# Patient Record
Sex: FEMALE | Employment: OTHER | URBAN - METROPOLITAN AREA
[De-identification: names, ages, dates, MRNs, and addresses within clinical notes are randomized per-mention and may not be internally consistent; named-entity substitution may affect disease eponyms.]

---

## 2024-08-15 ENCOUNTER — APPOINTMENT (OUTPATIENT)
Dept: GENERAL RADIOLOGY | Age: 79
End: 2024-08-15
Attending: INTERNAL MEDICINE
Payer: MEDICARE

## 2024-08-15 ENCOUNTER — HOSPITAL ENCOUNTER (INPATIENT)
Age: 79
LOS: 9 days | Discharge: NURSING, REHAB OR LONG-TERM CARE | End: 2024-08-24
Attending: INTERNAL MEDICINE | Admitting: INTERNAL MEDICINE
Payer: MEDICARE

## 2024-08-15 DIAGNOSIS — K92.2 GASTROINTESTINAL HEMORRHAGE, UNSPECIFIED GASTROINTESTINAL HEMORRHAGE TYPE: ICD-10-CM

## 2024-08-15 DIAGNOSIS — K92.2 UPPER GI BLEED: ICD-10-CM

## 2024-08-15 DIAGNOSIS — R06.02 SHORTNESS OF BREATH: Primary | ICD-10-CM

## 2024-08-15 LAB
ANION GAP SERPL CALCULATED.3IONS-SCNC: 11 MMOL/L (ref 9–17)
BUN SERPL-MCNC: 26 MG/DL (ref 8–23)
BUN/CREAT SERPL: 33 (ref 9–20)
CALCIUM SERPL-MCNC: 7.8 MG/DL (ref 8.6–10.4)
CHLORIDE SERPL-SCNC: 107 MMOL/L (ref 98–107)
CO2 SERPL-SCNC: 23 MMOL/L (ref 20–31)
CREAT SERPL-MCNC: 0.8 MG/DL (ref 0.5–0.9)
ERYTHROCYTE [DISTWIDTH] IN BLOOD BY AUTOMATED COUNT: 13.6 % (ref 11.8–14.4)
GFR, ESTIMATED: 75 ML/MIN/1.73M2
GLUCOSE SERPL-MCNC: 118 MG/DL (ref 70–99)
HCT VFR BLD AUTO: 27.1 % (ref 36.3–47.1)
HGB BLD-MCNC: 9.2 G/DL (ref 11.9–15.1)
LACTATE BLDV-SCNC: 1 MMOL/L (ref 0.5–2.2)
MCH RBC QN AUTO: 30.3 PG (ref 25.2–33.5)
MCHC RBC AUTO-ENTMCNC: 33.9 G/DL (ref 28.4–34.8)
MCV RBC AUTO: 89.1 FL (ref 82.6–102.9)
NRBC BLD-RTO: 0.3 PER 100 WBC
PLATELET # BLD AUTO: 225 K/UL (ref 138–453)
PMV BLD AUTO: 9.7 FL (ref 8.1–13.5)
POTASSIUM SERPL-SCNC: 3.6 MMOL/L (ref 3.7–5.3)
RBC # BLD AUTO: 3.04 M/UL (ref 3.95–5.11)
SODIUM SERPL-SCNC: 141 MMOL/L (ref 135–144)
TROPONIN I SERPL HS-MCNC: 105 NG/L (ref 0–14)
WBC OTHER # BLD: 16.8 K/UL (ref 3.5–11.3)

## 2024-08-15 PROCEDURE — 85014 HEMATOCRIT: CPT

## 2024-08-15 PROCEDURE — 85018 HEMOGLOBIN: CPT

## 2024-08-15 PROCEDURE — 86901 BLOOD TYPING SEROLOGIC RH(D): CPT

## 2024-08-15 PROCEDURE — 84484 ASSAY OF TROPONIN QUANT: CPT

## 2024-08-15 PROCEDURE — 86900 BLOOD TYPING SEROLOGIC ABO: CPT

## 2024-08-15 PROCEDURE — 93005 ELECTROCARDIOGRAM TRACING: CPT | Performed by: NURSE PRACTITIONER

## 2024-08-15 PROCEDURE — 85027 COMPLETE CBC AUTOMATED: CPT

## 2024-08-15 PROCEDURE — 6360000002 HC RX W HCPCS: Performed by: INTERNAL MEDICINE

## 2024-08-15 PROCEDURE — 2580000003 HC RX 258: Performed by: NURSE PRACTITIONER

## 2024-08-15 PROCEDURE — 71045 X-RAY EXAM CHEST 1 VIEW: CPT

## 2024-08-15 PROCEDURE — 2580000003 HC RX 258: Performed by: INTERNAL MEDICINE

## 2024-08-15 PROCEDURE — 6360000002 HC RX W HCPCS: Performed by: NURSE PRACTITIONER

## 2024-08-15 PROCEDURE — 2000000000 HC ICU R&B

## 2024-08-15 PROCEDURE — 83874 ASSAY OF MYOGLOBIN: CPT

## 2024-08-15 PROCEDURE — 86920 COMPATIBILITY TEST SPIN: CPT

## 2024-08-15 PROCEDURE — 99222 1ST HOSP IP/OBS MODERATE 55: CPT

## 2024-08-15 PROCEDURE — 83605 ASSAY OF LACTIC ACID: CPT

## 2024-08-15 PROCEDURE — 36415 COLL VENOUS BLD VENIPUNCTURE: CPT

## 2024-08-15 PROCEDURE — 80048 BASIC METABOLIC PNL TOTAL CA: CPT

## 2024-08-15 PROCEDURE — 86850 RBC ANTIBODY SCREEN: CPT

## 2024-08-15 RX ORDER — ACETAMINOPHEN 650 MG/1
650 SUPPOSITORY RECTAL EVERY 6 HOURS PRN
Status: DISCONTINUED | OUTPATIENT
Start: 2024-08-15 | End: 2024-08-16

## 2024-08-15 RX ORDER — SODIUM CHLORIDE 0.9 % (FLUSH) 0.9 %
5-40 SYRINGE (ML) INJECTION EVERY 12 HOURS SCHEDULED
Status: DISCONTINUED | OUTPATIENT
Start: 2024-08-15 | End: 2024-08-24 | Stop reason: HOSPADM

## 2024-08-15 RX ORDER — SODIUM CHLORIDE 0.9 % (FLUSH) 0.9 %
5-40 SYRINGE (ML) INJECTION PRN
Status: DISCONTINUED | OUTPATIENT
Start: 2024-08-15 | End: 2024-08-24 | Stop reason: HOSPADM

## 2024-08-15 RX ORDER — ACETAMINOPHEN 325 MG/1
650 TABLET ORAL EVERY 6 HOURS PRN
Status: DISCONTINUED | OUTPATIENT
Start: 2024-08-15 | End: 2024-08-16

## 2024-08-15 RX ORDER — SODIUM CHLORIDE 9 MG/ML
INJECTION, SOLUTION INTRAVENOUS PRN
Status: DISCONTINUED | OUTPATIENT
Start: 2024-08-15 | End: 2024-08-24 | Stop reason: HOSPADM

## 2024-08-15 RX ORDER — SODIUM CHLORIDE 9 MG/ML
INJECTION, SOLUTION INTRAVENOUS CONTINUOUS
Status: ACTIVE | OUTPATIENT
Start: 2024-08-15 | End: 2024-08-17

## 2024-08-15 RX ORDER — ONDANSETRON 4 MG/1
4 TABLET, ORALLY DISINTEGRATING ORAL EVERY 8 HOURS PRN
Status: DISCONTINUED | OUTPATIENT
Start: 2024-08-15 | End: 2024-08-24 | Stop reason: HOSPADM

## 2024-08-15 RX ORDER — ONDANSETRON 2 MG/ML
4 INJECTION INTRAMUSCULAR; INTRAVENOUS EVERY 6 HOURS PRN
Status: DISCONTINUED | OUTPATIENT
Start: 2024-08-15 | End: 2024-08-24 | Stop reason: HOSPADM

## 2024-08-15 RX ORDER — POTASSIUM CHLORIDE 7.45 MG/ML
10 INJECTION INTRAVENOUS PRN
Status: DISCONTINUED | OUTPATIENT
Start: 2024-08-15 | End: 2024-08-17

## 2024-08-15 RX ORDER — MORPHINE SULFATE 2 MG/ML
2 INJECTION, SOLUTION INTRAMUSCULAR; INTRAVENOUS ONCE
Status: COMPLETED | OUTPATIENT
Start: 2024-08-15 | End: 2024-08-15

## 2024-08-15 RX ORDER — POTASSIUM CHLORIDE 1500 MG/1
40 TABLET, EXTENDED RELEASE ORAL PRN
Status: DISCONTINUED | OUTPATIENT
Start: 2024-08-15 | End: 2024-08-17

## 2024-08-15 RX ADMIN — SODIUM CHLORIDE, PRESERVATIVE FREE 10 ML: 5 INJECTION INTRAVENOUS at 20:21

## 2024-08-15 RX ADMIN — OCTREOTIDE ACETATE 25 MCG/HR: 200 INJECTION, SOLUTION INTRAVENOUS; SUBCUTANEOUS at 20:20

## 2024-08-15 RX ADMIN — MORPHINE SULFATE 2 MG: 2 INJECTION, SOLUTION INTRAMUSCULAR; INTRAVENOUS at 23:20

## 2024-08-15 RX ADMIN — SODIUM CHLORIDE: 9 INJECTION, SOLUTION INTRAVENOUS at 20:19

## 2024-08-15 ASSESSMENT — PAIN SCALES - GENERAL
PAINLEVEL_OUTOF10: 8
PAINLEVEL_OUTOF10: 6
PAINLEVEL_OUTOF10: 8
PAINLEVEL_OUTOF10: 8

## 2024-08-15 ASSESSMENT — PAIN DESCRIPTION - FREQUENCY
FREQUENCY: CONTINUOUS
FREQUENCY: CONTINUOUS

## 2024-08-15 ASSESSMENT — PAIN DESCRIPTION - DESCRIPTORS
DESCRIPTORS: ACHING;SHARP
DESCRIPTORS: ACHING;SHARP

## 2024-08-15 ASSESSMENT — PAIN DESCRIPTION - ORIENTATION
ORIENTATION: RIGHT;LEFT
ORIENTATION: RIGHT;LEFT

## 2024-08-15 ASSESSMENT — PAIN - FUNCTIONAL ASSESSMENT
PAIN_FUNCTIONAL_ASSESSMENT: PREVENTS OR INTERFERES SOME ACTIVE ACTIVITIES AND ADLS
PAIN_FUNCTIONAL_ASSESSMENT: PREVENTS OR INTERFERES SOME ACTIVE ACTIVITIES AND ADLS

## 2024-08-15 ASSESSMENT — PAIN DESCRIPTION - ONSET
ONSET: ON-GOING
ONSET: ON-GOING

## 2024-08-15 ASSESSMENT — PAIN DESCRIPTION - PAIN TYPE
TYPE: ACUTE PAIN
TYPE: ACUTE PAIN

## 2024-08-15 ASSESSMENT — PAIN DESCRIPTION - LOCATION
LOCATION: CHEST
LOCATION: CHEST;RIB CAGE

## 2024-08-15 NOTE — CONSENT
Informed Consent for Blood Component Transfusion Note    I have discussed with the patient the rationale for blood component transfusion; its benefits in treating or preventing fatigue, organ damage, or death; and its risk which includes mild transfusion reactions, rare risk of blood borne infection, or more serious but rare reactions. I have discussed the alternatives to transfusion, including the risk and consequences of not receiving transfusion. The patient had an opportunity to ask questions and had agreed to proceed with transfusion of blood components.    Electronically signed by Shelli Gant MD on 8/15/24 at 7:09 PM EDT

## 2024-08-15 NOTE — PLAN OF CARE
[facility]  Office: [phone]  Randall Lundberg DO, Delon Robbins DO, Donte Joseph DO, Lexa Gamez DO, Shelli Gant MD, Shruti Soliz MD, Brandon Peter MD, Mery Hutchinson MD,  Rene Alas MD, Malick Chris MD, Mahsa Barrett MD,  Salome Rodriguez DO, Felicity Rangel MD, Mynor Mcmillan MD, Rohan Lundberg DO, Nati Arnold MD,  Antwan Rowan DO, Catrina Terry MD, Reny Olmstead MD, Rupali Trent MD, Raissa Cunningham MD,  Odilon Yee MD, Nick Wade MD, Surekha Ibrahim MD, Vickey Quintero MD, Joseph Pugh MD, Randy Regalado MD, Rene Black DO, Ramiro Munson DO, Quan Ravi MD,  Esteban Aranda MD, Shirley Waterhouse, CNP,  Sallie Romero CNP, Tadeo Sherman, CNP,  Polly Ramsey, POLINA, Loli Beltran, CNP, Natividad Gill, CNP, Yessica Juárez CNP, Marguerite Morelos CNP, Jackeline Levin, PA-C, Sheila Neff PA-C, Jazmine Ashford, CNP, Nathalie Meredith, CNP, Norma Gauthier, CNP, Kristin Lawrence CNP, Tala Hoover, CNS, Bhavani Rodriguez, TORITO, Gretta Palacios CNP, Tracy Schwab, CNP         [facility]   IN-PATIENT SERVICE   [facility]          8/15/2024    6:58 PM    Name:   Rosa Maria Joya  MRN:     [id number]     Acct:      [id number]   Room:   1115/1115-01   Day:  0  Admit Date:  8/15/2024  6:31 PM    PCP:   No primary care provider on file.  Code Status:  Full Code      Patient has been transferred from [facility].  As per information given to our NP she has history of GI bleed with hematemesis and occult blood positive stools her hemoglobin was 5.7 and she has been given 2 units of blood at [facility] currently she is on Protonix drip and octreotide drip.  Her CODE STATUS is DNR CCA.    Unfortunately so far registration of the patient has not been completed, I requested that nurse to release admitting orders so that I can review and order the labs stat to decide if more blood is needed to be given today.    Patient states she has been having black stools

## 2024-08-16 ENCOUNTER — APPOINTMENT (OUTPATIENT)
Dept: GENERAL RADIOLOGY | Age: 79
End: 2024-08-16
Attending: INTERNAL MEDICINE
Payer: MEDICARE

## 2024-08-16 ENCOUNTER — APPOINTMENT (OUTPATIENT)
Age: 79
End: 2024-08-16
Attending: INTERNAL MEDICINE
Payer: MEDICARE

## 2024-08-16 PROBLEM — R79.89 TROPONIN LEVEL ELEVATED: Status: ACTIVE | Noted: 2024-08-16

## 2024-08-16 PROBLEM — F17.200 SMOKER: Status: ACTIVE | Noted: 2024-08-16

## 2024-08-16 PROBLEM — F19.90 EXCESSIVE USE OF NONSTEROIDAL ANTI-INFLAMMATORY DRUG (NSAID): Status: ACTIVE | Noted: 2024-08-16

## 2024-08-16 LAB
ALBUMIN SERPL-MCNC: 3 G/DL (ref 3.5–5.2)
ALP SERPL-CCNC: 40 U/L (ref 35–104)
ALT SERPL-CCNC: 39 U/L (ref 5–33)
ANION GAP SERPL CALCULATED.3IONS-SCNC: 10 MMOL/L (ref 9–17)
AST SERPL-CCNC: 53 U/L
BILIRUB SERPL-MCNC: 0.2 MG/DL (ref 0.3–1.2)
BUN SERPL-MCNC: 23 MG/DL (ref 8–23)
BUN/CREAT SERPL: 33 (ref 9–20)
CALCIUM SERPL-MCNC: 7.7 MG/DL (ref 8.6–10.4)
CHLORIDE SERPL-SCNC: 110 MMOL/L (ref 98–107)
CO2 SERPL-SCNC: 24 MMOL/L (ref 20–31)
CREAT SERPL-MCNC: 0.7 MG/DL (ref 0.5–0.9)
ECHO AO ROOT DIAM: 2.5 CM
ECHO AO ROOT INDEX: 1.67 CM/M2
ECHO AV PEAK GRADIENT: 9 MMHG
ECHO AV PEAK VELOCITY: 1.5 M/S
ECHO BSA: 1.51 M2
ECHO EST RA PRESSURE: 3 MMHG
ECHO LA AREA 4C: 20.9 CM2
ECHO LA DIAMETER INDEX: 2.6 CM/M2
ECHO LA DIAMETER: 3.9 CM
ECHO LA MAJOR AXIS: 6 CM
ECHO LA TO AORTIC ROOT RATIO: 1.56
ECHO LA VOL MOD A4C: 58 ML (ref 22–52)
ECHO LA VOLUME INDEX MOD A4C: 39 ML/M2 (ref 16–34)
ECHO LV E' LATERAL VELOCITY: 6 CM/S
ECHO LV E' SEPTAL VELOCITY: 4 CM/S
ECHO LV FRACTIONAL SHORTENING: 42 % (ref 28–44)
ECHO LV INTERNAL DIMENSION DIASTOLE INDEX: 2.87 CM/M2
ECHO LV INTERNAL DIMENSION DIASTOLIC: 4.3 CM (ref 3.9–5.3)
ECHO LV INTERNAL DIMENSION SYSTOLIC INDEX: 1.67 CM/M2
ECHO LV INTERNAL DIMENSION SYSTOLIC: 2.5 CM
ECHO LV IVSD: 0.8 CM (ref 0.6–0.9)
ECHO LV MASS 2D: 114.2 G (ref 67–162)
ECHO LV MASS INDEX 2D: 76.1 G/M2 (ref 43–95)
ECHO LV POSTERIOR WALL DIASTOLIC: 0.9 CM (ref 0.6–0.9)
ECHO LV RELATIVE WALL THICKNESS RATIO: 0.42
ECHO MV A VELOCITY: 0.95 M/S
ECHO MV E DECELERATION TIME (DT): 134 MS
ECHO MV E VELOCITY: 1.04 M/S
ECHO MV E/A RATIO: 1.09
ECHO MV E/E' LATERAL: 17.33
ECHO MV E/E' RATIO (AVERAGED): 21.67
ECHO MV E/E' SEPTAL: 26
ECHO RIGHT VENTRICULAR SYSTOLIC PRESSURE (RVSP): 25 MMHG
ECHO TV REGURGITANT MAX VELOCITY: 2.32 M/S
ECHO TV REGURGITANT PEAK GRADIENT: 22 MMHG
GFR, ESTIMATED: 88 ML/MIN/1.73M2
GLUCOSE BLD-MCNC: 123 MG/DL (ref 65–105)
GLUCOSE SERPL-MCNC: 131 MG/DL (ref 70–99)
HCT VFR BLD AUTO: 26.6 % (ref 36.3–47.1)
HCT VFR BLD AUTO: 27.5 % (ref 36.3–47.1)
HCT VFR BLD AUTO: 27.7 % (ref 36.3–47.1)
HCT VFR BLD AUTO: 27.7 % (ref 36.3–47.1)
HCT VFR BLD AUTO: 28.7 % (ref 36.3–47.1)
HCT VFR BLD AUTO: 30.9 % (ref 36.3–47.1)
HCT VFR BLD AUTO: 31.9 % (ref 36.3–47.1)
HGB BLD-MCNC: 10 G/DL (ref 11.9–15.1)
HGB BLD-MCNC: 8.6 G/DL (ref 11.9–15.1)
HGB BLD-MCNC: 8.9 G/DL (ref 11.9–15.1)
HGB BLD-MCNC: 9 G/DL (ref 11.9–15.1)
HGB BLD-MCNC: 9.1 G/DL (ref 11.9–15.1)
HGB BLD-MCNC: 9.4 G/DL (ref 11.9–15.1)
HGB BLD-MCNC: 9.9 G/DL (ref 11.9–15.1)
INR PPP: 1.2
IRON SATN MFR SERPL: 17 % (ref 20–55)
IRON SERPL-MCNC: 45 UG/DL (ref 37–145)
LACTATE BLDV-SCNC: 0.8 MMOL/L (ref 0.5–2.2)
MYOGLOBIN SERPL-MCNC: 147 NG/ML (ref 25–58)
MYOGLOBIN SERPL-MCNC: 184 NG/ML (ref 25–58)
MYOGLOBIN SERPL-MCNC: 275 NG/ML (ref 25–58)
PARTIAL THROMBOPLASTIN TIME: 28 SEC (ref 23.9–33.8)
PHOSPHATE SERPL-MCNC: 2.7 MG/DL (ref 2.6–4.5)
POTASSIUM SERPL-SCNC: 4 MMOL/L (ref 3.7–5.3)
PROCALCITONIN SERPL-MCNC: 0.09 NG/ML (ref 0–0.09)
PROT SERPL-MCNC: 5.1 G/DL (ref 6.4–8.3)
PROTHROMBIN TIME: 15.4 SEC (ref 11.5–14.2)
SODIUM SERPL-SCNC: 144 MMOL/L (ref 135–144)
TIBC SERPL-MCNC: 271 UG/DL (ref 250–450)
TROPONIN I SERPL HS-MCNC: 102 NG/L (ref 0–14)
TROPONIN I SERPL HS-MCNC: 110 NG/L (ref 0–14)
TROPONIN I SERPL HS-MCNC: 118 NG/L (ref 0–14)
UNSATURATED IRON BINDING CAPACITY: 226 UG/DL (ref 112–347)

## 2024-08-16 PROCEDURE — 3700000001 HC ADD 15 MINUTES (ANESTHESIA): Performed by: INTERNAL MEDICINE

## 2024-08-16 PROCEDURE — 93306 TTE W/DOPPLER COMPLETE: CPT

## 2024-08-16 PROCEDURE — 2500000003 HC RX 250 WO HCPCS: Performed by: INTERNAL MEDICINE

## 2024-08-16 PROCEDURE — 80053 COMPREHEN METABOLIC PANEL: CPT

## 2024-08-16 PROCEDURE — 2580000003 HC RX 258: Performed by: INTERNAL MEDICINE

## 2024-08-16 PROCEDURE — 83874 ASSAY OF MYOGLOBIN: CPT

## 2024-08-16 PROCEDURE — 83540 ASSAY OF IRON: CPT

## 2024-08-16 PROCEDURE — 85018 HEMOGLOBIN: CPT

## 2024-08-16 PROCEDURE — 3609013000 HC EGD TRANSORAL CONTROL BLEEDING ANY METHOD: Performed by: INTERNAL MEDICINE

## 2024-08-16 PROCEDURE — 6360000002 HC RX W HCPCS: Performed by: ANESTHESIOLOGY

## 2024-08-16 PROCEDURE — 99232 SBSQ HOSP IP/OBS MODERATE 35: CPT | Performed by: INTERNAL MEDICINE

## 2024-08-16 PROCEDURE — 83605 ASSAY OF LACTIC ACID: CPT

## 2024-08-16 PROCEDURE — 36415 COLL VENOUS BLD VENIPUNCTURE: CPT

## 2024-08-16 PROCEDURE — 84145 PROCALCITONIN (PCT): CPT

## 2024-08-16 PROCEDURE — 0D968ZX DRAINAGE OF STOMACH, VIA NATURAL OR ARTIFICIAL OPENING ENDOSCOPIC, DIAGNOSTIC: ICD-10-PCS | Performed by: INTERNAL MEDICINE

## 2024-08-16 PROCEDURE — 2720000010 HC SURG SUPPLY STERILE: Performed by: INTERNAL MEDICINE

## 2024-08-16 PROCEDURE — 85730 THROMBOPLASTIN TIME PARTIAL: CPT

## 2024-08-16 PROCEDURE — 2709999900 HC NON-CHARGEABLE SUPPLY: Performed by: INTERNAL MEDICINE

## 2024-08-16 PROCEDURE — 88305 TISSUE EXAM BY PATHOLOGIST: CPT

## 2024-08-16 PROCEDURE — 2000000000 HC ICU R&B

## 2024-08-16 PROCEDURE — 6370000000 HC RX 637 (ALT 250 FOR IP): Performed by: INTERNAL MEDICINE

## 2024-08-16 PROCEDURE — 6360000002 HC RX W HCPCS: Performed by: INTERNAL MEDICINE

## 2024-08-16 PROCEDURE — 0D598ZZ DESTRUCTION OF DUODENUM, VIA NATURAL OR ARTIFICIAL OPENING ENDOSCOPIC: ICD-10-PCS | Performed by: INTERNAL MEDICINE

## 2024-08-16 PROCEDURE — 6360000002 HC RX W HCPCS

## 2024-08-16 PROCEDURE — 74022 RADEX COMPL AQT ABD SERIES: CPT

## 2024-08-16 PROCEDURE — 02HV33Z INSERTION OF INFUSION DEVICE INTO SUPERIOR VENA CAVA, PERCUTANEOUS APPROACH: ICD-10-PCS | Performed by: INTERNAL MEDICINE

## 2024-08-16 PROCEDURE — 3609012400 HC EGD TRANSORAL BIOPSY SINGLE/MULTIPLE: Performed by: INTERNAL MEDICINE

## 2024-08-16 PROCEDURE — 2580000003 HC RX 258: Performed by: NURSE PRACTITIONER

## 2024-08-16 PROCEDURE — 82947 ASSAY GLUCOSE BLOOD QUANT: CPT

## 2024-08-16 PROCEDURE — 93005 ELECTROCARDIOGRAM TRACING: CPT | Performed by: INTERNAL MEDICINE

## 2024-08-16 PROCEDURE — 7100000000 HC PACU RECOVERY - FIRST 15 MIN: Performed by: INTERNAL MEDICINE

## 2024-08-16 PROCEDURE — 83550 IRON BINDING TEST: CPT

## 2024-08-16 PROCEDURE — 85610 PROTHROMBIN TIME: CPT

## 2024-08-16 PROCEDURE — 2580000003 HC RX 258

## 2024-08-16 PROCEDURE — 84484 ASSAY OF TROPONIN QUANT: CPT

## 2024-08-16 PROCEDURE — 3700000000 HC ANESTHESIA ATTENDED CARE: Performed by: INTERNAL MEDICINE

## 2024-08-16 PROCEDURE — 85014 HEMATOCRIT: CPT

## 2024-08-16 PROCEDURE — 7100000001 HC PACU RECOVERY - ADDTL 15 MIN: Performed by: INTERNAL MEDICINE

## 2024-08-16 PROCEDURE — 74018 RADEX ABDOMEN 1 VIEW: CPT

## 2024-08-16 PROCEDURE — 84100 ASSAY OF PHOSPHORUS: CPT

## 2024-08-16 PROCEDURE — 2500000003 HC RX 250 WO HCPCS

## 2024-08-16 RX ORDER — SUCRALFATE 1 G/1
1 TABLET ORAL 4 TIMES DAILY
COMMUNITY

## 2024-08-16 RX ORDER — ALBUTEROL SULFATE 0.83 MG/ML
2.5 SOLUTION RESPIRATORY (INHALATION) ONCE
Status: COMPLETED | OUTPATIENT
Start: 2024-08-16 | End: 2024-08-16

## 2024-08-16 RX ORDER — METOPROLOL SUCCINATE 25 MG/1
25 TABLET, EXTENDED RELEASE ORAL DAILY
Status: ON HOLD | COMMUNITY
End: 2024-08-24 | Stop reason: HOSPADM

## 2024-08-16 RX ORDER — ACETAMINOPHEN 325 MG/1
650 TABLET ORAL EVERY 6 HOURS PRN
Status: DISCONTINUED | OUTPATIENT
Start: 2024-08-16 | End: 2024-08-24 | Stop reason: HOSPADM

## 2024-08-16 RX ORDER — MORPHINE SULFATE 2 MG/ML
2 INJECTION, SOLUTION INTRAMUSCULAR; INTRAVENOUS EVERY 4 HOURS PRN
Status: DISCONTINUED | OUTPATIENT
Start: 2024-08-16 | End: 2024-08-20

## 2024-08-16 RX ORDER — FUROSEMIDE 10 MG/ML
20 INJECTION INTRAMUSCULAR; INTRAVENOUS ONCE
Status: COMPLETED | OUTPATIENT
Start: 2024-08-16 | End: 2024-08-16

## 2024-08-16 RX ORDER — LOSARTAN POTASSIUM 100 MG/1
100 TABLET ORAL DAILY
COMMUNITY

## 2024-08-16 RX ORDER — SERTRALINE HYDROCHLORIDE 200 MG/1
2 CAPSULE ORAL DAILY
Status: ON HOLD | COMMUNITY
End: 2024-08-24

## 2024-08-16 RX ORDER — PIOGLITAZONEHYDROCHLORIDE 30 MG/1
30 TABLET ORAL DAILY
Status: ON HOLD | COMMUNITY
End: 2024-08-24 | Stop reason: HOSPADM

## 2024-08-16 RX ORDER — THIAMINE HYDROCHLORIDE 100 MG/ML
100 INJECTION, SOLUTION INTRAMUSCULAR; INTRAVENOUS DAILY
Status: DISCONTINUED | OUTPATIENT
Start: 2024-08-16 | End: 2024-08-18

## 2024-08-16 RX ORDER — FUROSEMIDE 10 MG/ML
40 INJECTION INTRAMUSCULAR; INTRAVENOUS ONCE
Status: COMPLETED | OUTPATIENT
Start: 2024-08-16 | End: 2024-08-16

## 2024-08-16 RX ORDER — NICOTINE 21 MG/24HR
1 PATCH, TRANSDERMAL 24 HOURS TRANSDERMAL DAILY
Status: DISCONTINUED | OUTPATIENT
Start: 2024-08-16 | End: 2024-08-24 | Stop reason: HOSPADM

## 2024-08-16 RX ORDER — PANTOPRAZOLE SODIUM 40 MG/1
40 TABLET, DELAYED RELEASE ORAL 2 TIMES DAILY
Status: ON HOLD | COMMUNITY
End: 2024-08-24

## 2024-08-16 RX ORDER — EPINEPHRINE 1 MG/ML
INJECTION, SOLUTION INTRAMUSCULAR; SUBCUTANEOUS PRN
Status: DISCONTINUED | OUTPATIENT
Start: 2024-08-16 | End: 2024-08-16 | Stop reason: ALTCHOICE

## 2024-08-16 RX ORDER — ACETAMINOPHEN 650 MG/1
650 SUPPOSITORY RECTAL EVERY 6 HOURS PRN
Status: DISCONTINUED | OUTPATIENT
Start: 2024-08-16 | End: 2024-08-24 | Stop reason: HOSPADM

## 2024-08-16 RX ADMIN — THIAMINE HYDROCHLORIDE: 100 INJECTION, SOLUTION INTRAMUSCULAR; INTRAVENOUS at 08:37

## 2024-08-16 RX ADMIN — OCTREOTIDE ACETATE 25 MCG/HR: 200 INJECTION, SOLUTION INTRAVENOUS; SUBCUTANEOUS at 12:35

## 2024-08-16 RX ADMIN — FUROSEMIDE 40 MG: 10 INJECTION, SOLUTION INTRAMUSCULAR; INTRAVENOUS at 19:35

## 2024-08-16 RX ADMIN — THIAMINE HYDROCHLORIDE 100 MG: 100 INJECTION, SOLUTION INTRAMUSCULAR; INTRAVENOUS at 21:41

## 2024-08-16 RX ADMIN — PANTOPRAZOLE SODIUM 8 MG/HR: 40 INJECTION, POWDER, FOR SOLUTION INTRAVENOUS at 22:27

## 2024-08-16 RX ADMIN — PANTOPRAZOLE SODIUM 8 MG/HR: 40 INJECTION, POWDER, FOR SOLUTION INTRAVENOUS at 02:26

## 2024-08-16 RX ADMIN — SODIUM CHLORIDE: 9 INJECTION, SOLUTION INTRAVENOUS at 22:29

## 2024-08-16 RX ADMIN — FOLIC ACID 1 MG: 5 INJECTION, SOLUTION INTRAMUSCULAR; INTRAVENOUS; SUBCUTANEOUS at 21:48

## 2024-08-16 RX ADMIN — SODIUM CHLORIDE, PRESERVATIVE FREE 10 ML: 5 INJECTION INTRAVENOUS at 22:13

## 2024-08-16 RX ADMIN — ALBUTEROL SULFATE 2.5 MG: 2.5 SOLUTION RESPIRATORY (INHALATION) at 16:46

## 2024-08-16 RX ADMIN — SODIUM CHLORIDE, PRESERVATIVE FREE 80 MG: 5 INJECTION INTRAVENOUS at 02:01

## 2024-08-16 RX ADMIN — PANTOPRAZOLE SODIUM 8 MG/HR: 40 INJECTION, POWDER, FOR SOLUTION INTRAVENOUS at 12:30

## 2024-08-16 RX ADMIN — FUROSEMIDE 20 MG: 10 INJECTION, SOLUTION INTRAMUSCULAR; INTRAVENOUS at 16:47

## 2024-08-16 RX ADMIN — MORPHINE SULFATE 2 MG: 2 INJECTION, SOLUTION INTRAMUSCULAR; INTRAVENOUS at 12:32

## 2024-08-16 RX ADMIN — MORPHINE SULFATE 2 MG: 2 INJECTION, SOLUTION INTRAMUSCULAR; INTRAVENOUS at 19:28

## 2024-08-16 RX ADMIN — SODIUM CHLORIDE, PRESERVATIVE FREE 10 ML: 5 INJECTION INTRAVENOUS at 08:34

## 2024-08-16 RX ADMIN — SODIUM CHLORIDE: 9 INJECTION, SOLUTION INTRAVENOUS at 05:32

## 2024-08-16 RX ADMIN — MORPHINE SULFATE 2 MG: 2 INJECTION, SOLUTION INTRAMUSCULAR; INTRAVENOUS at 06:28

## 2024-08-16 ASSESSMENT — PAIN SCALES - GENERAL
PAINLEVEL_OUTOF10: 7
PAINLEVEL_OUTOF10: 7
PAINLEVEL_OUTOF10: 3
PAINLEVEL_OUTOF10: 3
PAINLEVEL_OUTOF10: 7
PAINLEVEL_OUTOF10: 7
PAINLEVEL_OUTOF10: 6
PAINLEVEL_OUTOF10: 3

## 2024-08-16 ASSESSMENT — PAIN DESCRIPTION - LOCATION
LOCATION: HEAD
LOCATION: CHEST
LOCATION: ABDOMEN
LOCATION: ABDOMEN

## 2024-08-16 ASSESSMENT — PAIN DESCRIPTION - PAIN TYPE
TYPE: ACUTE PAIN
TYPE: ACUTE PAIN

## 2024-08-16 ASSESSMENT — PAIN DESCRIPTION - ONSET
ONSET: ON-GOING
ONSET: ON-GOING

## 2024-08-16 ASSESSMENT — PAIN DESCRIPTION - DESCRIPTORS
DESCRIPTORS: ACHING;SORE;SHARP
DESCRIPTORS: ACHING;SHARP
DESCRIPTORS: ACHING
DESCRIPTORS: SHARP

## 2024-08-16 ASSESSMENT — PAIN SCALES - WONG BAKER: WONGBAKER_NUMERICALRESPONSE: NO HURT

## 2024-08-16 ASSESSMENT — PAIN - FUNCTIONAL ASSESSMENT
PAIN_FUNCTIONAL_ASSESSMENT: PREVENTS OR INTERFERES SOME ACTIVE ACTIVITIES AND ADLS
PAIN_FUNCTIONAL_ASSESSMENT: PREVENTS OR INTERFERES WITH MANY ACTIVE NOT PASSIVE ACTIVITIES
PAIN_FUNCTIONAL_ASSESSMENT: PREVENTS OR INTERFERES SOME ACTIVE ACTIVITIES AND ADLS

## 2024-08-16 ASSESSMENT — PAIN DESCRIPTION - ORIENTATION
ORIENTATION: RIGHT;LEFT
ORIENTATION: LEFT
ORIENTATION: LEFT;RIGHT
ORIENTATION: RIGHT;LEFT

## 2024-08-16 ASSESSMENT — PAIN DESCRIPTION - FREQUENCY
FREQUENCY: CONTINUOUS
FREQUENCY: CONTINUOUS

## 2024-08-16 NOTE — OP NOTE
EGD NOTE      Patient:   Rosa Maria Joya    :    1945    Facility:   [facility]  Referring/PCP: No primary care provider on file.    Procedure:   Esophagogastroduodenoscopy with biopsy, control of bleeding  Date:     2024   Endoscopist:  Van South D.O.  Assistant:                  None    Preoperative Diagnosis:     Hematemesis  2.   Acute blood loss anemia    Postoperative Diagnosis:    Schatzki ring.  Active UGI bleed with visible vessel (duodenum)    Anesthesia:  MAC    Complications: None    Description of Procedure:  Informed consent was obtained after explanation of the procedure including indications, description of the procedure,  benefits and possible risks and complications of the procedure, and alternatives. Questions were answered.  The patient's history was reviewed and a directed physical examination was performed prior to the procedure.    Patient was monitored throughout the procedure with pulse oximetry and periodic assessment of vital signs. Patient was sedated as noted above. With the patient in the left lateral decubitus position, the Olympus videoendoscope was placed in the patient's mouth and under direct visualization passed into the esophagus.  Visualization of the esophagus, stomach, and duodenum was performed during both introduction and withdrawal of the endoscope and retroflexed view of the proximal stomach was obtained. The scope was passed to the 2nd portion of the duodenum.  The patient tolerated the procedure well and was taken to the recovery area in good condition.    EBL: none  Specimen:  Gastric  Implants: None      Findings::   Esophagus: Mild schatzki ring.   Stomach: Large amount of fresh blood with clots in body.  Irrigated/suctioned/cleared.  No lesions seen.  Duodenum: Actively bleeding/spurting prominent vessel at spot just between 1st/2nd part of duodenum.  Suspect Dieulafoy lesion vs small ulcer with exposed vessel.  4 cc epi injection and bipolar

## 2024-08-16 NOTE — PROGRESS NOTES
End Of Shift Note  [facility]  Summary of shift: Patient alert, oriented x4 with periods of disorientation and hallucinations, lethargy improving after EGD. Patient to EGD, see op note. Post op complications with dyspnea and abdominal distention, see notes. NG placed and to LIWS, no output. Abdomen soft, distended, tenderness. Xray concerning for CHF, received lasix, see mar. Unmeasured large amount of urine output, having dark red bowel movements after EGD. Cardiology consulted. Continues on Protonix drip. Plan for picc line placement by access. H&H q4h, NPO.     Vitals:    Vitals:    08/16/24 1800 08/16/24 1842 08/16/24 1844 08/16/24 1928   BP: (!) 145/58      Pulse: (!) 111      Resp: 19   15   Temp: 97.3 °F (36.3 °C)      TempSrc:       SpO2: 98% 94% 90%    Weight:       Height:            I&O:   Intake/Output Summary (Last 24 hours) at 8/16/2024 1934  Last data filed at 8/16/2024 1932  Gross per 24 hour   Intake 2488.83 ml   Output 550 ml   Net 1938.83 ml       Resp Status: nasal canula, 4L, rhonchi/diminished    Ventilator Settings:     / / /     Critical Care IV infusions:   sodium chloride 100 mL/hr at 08/16/24 1932    sodium chloride      pantoprazole 8 mg/hr (08/16/24 1932)        LDA:   Peripheral IV 08/16/24 Left (Active)   Number of days: 0       NG/OG/NJ/NE Tube Nasogastric 14 fr Left nostril (Active)   Number of days: 0       External Urinary Catheter (Active)   Number of days: 0

## 2024-08-16 NOTE — H&P
[facility]  Office: [phone]  aRndall Lundberg DO, Delon Robbins DO, Donte Joseph DO, Lexa Gamez DO, Shelli Gant MD, Shruti Soliz MD, Brandon Peter MD, Mery Hutchinson MD,  Rene Alas MD, Malick Chris MD, Mahsa Barrett MD,  Salome Rodriguez DO, Felicity Rangel MD, Mynor Mcmillan MD, Rohan Lundberg DO, Nati Arnold MD,  Antwan Rowan DO, Catrina Terry MD, Reny Olmstead MD, Rupali Trent MD, Raissa Cunningham MD,  Odilon Yee MD, Nick Wade MD, Surekha Ibrahim MD, Vickey Quintero MD, Joseph Pugh MD, Randy Regalado MD, Rene Black DO, Ramiro Munson DO, Quan Ravi MD,  Esteban Aranda MD, Shirley Waterhouse, CNP,  Sallie Romero, CNP, Tadeo Sherman, CNP,  Polly Ramsey, DNP, Loli Beltran, CNP, Natividad Gill, CNP, Kristin Lawrence CNP, Yessica Juárez, CNP, Marguerite Morelos, CNP, Jackeline Levin, PA-C, Sheila Neff, PA-C, Jazmine Ashford, CNP, Ashley Howard, CNP, Norma Gauthier, CNP, Tala Hoover, CNS, Bhavani Rodriguez, CNP, Gretta Palacios, CNP, Tracy Schwab, CNP         [facility]   IN-PATIENT SERVICE   [facility]    HISTORY AND PHYSICAL EXAMINATION            Date:   8/15/2024  Patient name:  Rosa Maria Joya  Date of admission:  8/15/2024  6:31 PM  MRN:   [id number]  Account:  [id number]  YOB: 1945  PCP:    No primary care provider on file.  Room:   [address] [place] [address]  Code Status:    DNR-CCA    Chief Complaint:     No chief complaint on file.      History Obtained From:     patient, paper medical record    History of Present Illness:     Rosa Maria Joya is a 78 y.o. Unavailable / unknown female who presents with No chief complaint on file.   and is admitted to the hospital for the management of Upper GI bleed.    Patient presents as a transfer from [facility] where she initially presented with hematemesis. She was recently discharged home with coming hospital on 8/12/2024 status post mechanical fall with broken ribs.  She  does have a past medical history significant for peptic ulcer disease with previous endoscopies.  Upon arrival to outside facility patient's hemoglobin returned at 5.7, WBC 15.2, INR 1.4, creatinine 0.8, sodium 134, lactic acid 5.1 with a negative ethanol level. An EKG revealed normal sinus rhythm with diffuse ST changes without acute injury. GI services were consulted at our facility and the patient was transferred here for higher level of care.  We were asked to admit the patient to medicine services.    During my initial assessment, it is noted that the patient is alert and oriented x 2 and a poor historian. It was difficult to obtain any past medical history additionally current medications.    Past Medical History:     No past medical history on file.     Past Surgical History:     No past surgical history on file.     Medications Prior to Admission:     Prior to Admission medications    Not on File        Allergies:     Patient has no allergy information on record.    Social History:     Tobacco:    has no history on file for tobacco use.  Alcohol:      has no history on file for alcohol use.  Drug Use:  has no history on file for drug use.    Family History:     No family history on file.    Review of Systems:     Positive and Negative as described in HPI.    Review of Systems   Constitutional:  Negative for appetite change, chills, fatigue and fever.   Eyes:  Negative for visual disturbance.   Respiratory:  Negative for shortness of breath and wheezing.    Cardiovascular:  Negative for chest pain and leg swelling.   Gastrointestinal:  Positive for abdominal pain. Negative for constipation, nausea and vomiting.   Endocrine: Negative for polydipsia, polyphagia and polyuria.   Genitourinary:  Negative for decreased urine volume, difficulty urinating and flank pain.   Musculoskeletal:  Negative for arthralgias.   Skin:  Negative for rash.   Neurological:  Negative for tremors and weakness.

## 2024-08-16 NOTE — PROGRESS NOTES
Update received from Dr. South. Family updated and questions answered. Cardiology updated. Awaiting report from RN

## 2024-08-16 NOTE — PROGRESS NOTES
[facility]  Office: [phone]  Randall Lundberg DO, Delon Robbins DO, Donte Joseph DO, Lexa Gamez DO, Shelli Gant MD, Shruti Soliz MD, Brandon Peter MD, Mery Hutchinson MD,  Rene Alas MD, Malick Chris MD, Mahsa Barrett MD,  Salome Rodriguez DO, Felicity Rangel MD, Mynor Mcmillan MD, Rohan Lundberg DO, Nati Arnold MD,  Antwan Rowan DO, Catrina Terry MD, Reny Olmstead MD, Rupali Trent MD, Raissa Cunningham MD,  Odilon Yee MD, Nick Wade MD, Surekha Ibrahim MD, Vickey Quintero MD, Joseph Pugh MD, Randy Regalado MD, Rene Black DO, Ramiro Munson DO, Quan Ravi MD,  Esteban Aranda MD, Shirley Waterhouse, CNP,  Sallie Romero CNP, Tadeo Sherman, CNP,  Polly Ramsey, POLINA, Loli Beltran, CNP, Natividad Gill, CNP, Yessica Juárez CNP, Marguerite Morelos CNP, Jackeline Levin, PA-C, Sheila Neff PA-C, Jazmine Ashford, CNP, Nathalie Meredith, CNP, Norma Gauthier, CNP, Kristin Lawrence CNP, Tala Hoover, CNS, Bhavani Rodriguez, TORITO, Gretta Palacios CNP, Tracy Schwab, CNP         [facility]   IN-PATIENT SERVICE   [facility]    Progress Note    8/16/2024    1:55 PM    Name:   Rosa Maria Joya  MRN:     [id number]     Acct:      [id number]   Room:   [facility] Day:  1  Admit Date:  8/15/2024  6:31 PM    PCP:   No primary care provider on file.  Code Status:  DNR-CCA    Subjective:     C/C: hemetemesis and melena  Interval History Status: .   Patient states she had 2 bloody emesis at night but her daughter at bedside and nurse reported no vomiting overnight.  She was given 2 units of PRBC before transferring to this hospital, her hemoglobin since yesterday has been relatively stable 9.2-9 0.4-8.9.  Troponins have been slowly trending up 105-102-118  EKG shows normal sinus  Echo is done but not resulted yet  Cardiology consult was placed  She is likely to get EGD today  Currently she is on Protonix and octreotide drip  Per daughter-in-law at bedside she     sodium chloride      pantoprazole 8 mg/hr (24 1230)    Octreotide Acetate 500 mcg in sodium chloride 0.9 % 100 mL infusion 25 mcg/hr (24 1235)     PRN Meds: morphine, sodium chloride flush, sodium chloride, ondansetron **OR** ondansetron, acetaminophen **OR** acetaminophen, potassium chloride **OR** potassium alternative oral replacement **OR** potassium chloride    Data:     Past Medical History:   has no past medical history on file.    Social History:   reports that she has been smoking cigarettes. She has never used smokeless tobacco. She reports current alcohol use. She reports that she does not use drugs.     Family History: No family history on file.    Vitals:  BP (!) 140/54   Pulse (!) 104   Temp 97.8 °F (36.6 °C) (Oral)   Resp 19   Ht 1.524 m (5')   Wt 54.2 kg (119 lb 7.8 oz)   SpO2 (!) 88%   BMI 23.34 kg/m²   Temp (24hrs), Av.8 °F (36.6 °C), Min:97.5 °F (36.4 °C), Max:97.9 °F (36.6 °C)    No results for input(s): \"POCGLU\" in the last 72 hours.    I/O (24Hr):    Intake/Output Summary (Last 24 hours) at 2024 1355  Last data filed at 2024 0934  Gross per 24 hour   Intake 1387.35 ml   Output 100 ml   Net 1287.35 ml       Labs:  Hematology:  Recent Labs     08/15/24  2144 08/15/24  2346 24  0716 24  1116 24  1256   WBC 16.8*  --   --   --   --    RBC 3.04*  --   --   --   --    HGB 9.2*   < > 9.1* 9.0* 8.9*   HCT 27.1*   < > 27.7* 27.5* 27.7*   MCV 89.1  --   --   --   --    MCH 30.3  --   --   --   --    MCHC 33.9  --   --   --   --    RDW 13.6  --   --   --   --      --   --   --   --    MPV 9.7  --   --   --   --    INR  --   --  1.2  --   --     < > = values in this interval not displayed.     Chemistry:  Recent Labs     08/15/24  2144 08/15/24  2346 24  0716 24  1256     --  144  --    K 3.6*  --  4.0  --      --  110*  --    CO2 23  --  24  --    GLUCOSE 118*  --  131*  --    BUN 26*  --  23  --    CREATININE 0.8  --

## 2024-08-16 NOTE — CONSULTS
Reason for Consult: Elevated troponin  Requesting Physician: Shelli Gant MD    CHIEF COMPLAINT:  \" I was coughing up blood\"    History Obtained From:  patient    HISTORY OF PRESENT ILLNESS:      The patient is a 78 y.o. female with significant past medical history of hypertension, no prior cardiac history.  She was admitted to [facility] with hematemesis.  Transferred to [facility] for more urgent care.  Underwent urgent EGD today with epi injection and cauterization of active duodenal bleeding source    Received 2 units packed red blood cells yesterday    Given 20 IV of Lasix due to concern for congestion on chest x-ray    Patient without prior cardiac history.  She is complaining of some chest pressure since admission.  She denies prior edema, orthopnea, PND    Telemetry since return from the OR shows sinus tachycardia in the 110s beats per minute    Past Medical History:    No past medical history on file.  Past Surgical History:    No past surgical history on file.  Home Medications:  Prior to Admission medications    Medication Sig Start Date End Date Taking? Authorizing Provider   Sertraline HCl 200 MG CAPS Take 2 tablets by mouth daily   Yes Benji Cisneros MD   sucralfate (CARAFATE) 1 GM tablet Take 1 tablet by mouth 4 times daily   Yes Benji Cisneros MD   pantoprazole (PROTONIX) 40 MG tablet Take 1 tablet by mouth 2 times daily   Yes Benji Cisneros MD   pioglitazone (ACTOS) 30 MG tablet Take 1 tablet by mouth daily   Yes Benji Cisneros MD   metoprolol succinate (TOPROL XL) 25 MG extended release tablet Take 1 tablet by mouth daily   Yes Benji Cisneros MD   losartan (COZAAR) 100 MG tablet Take 1 tablet by mouth daily   Yes Benji Cisneros MD     Current Medications:    Current Facility-Administered Medications   Medication Dose Route Frequency Provider Last Rate Last Admin    morphine (PF) injection 2 mg  2 mg IntraVENous Q4H PRN Van South     RDW 13.6 08/15/2024 09:44 PM     08/15/2024 09:44 PM    MPV 9.7 08/15/2024 09:44 PM     CMP:  Lab Results   Component Value Date/Time     08/16/2024 07:16 AM    K 4.0 08/16/2024 07:16 AM     08/16/2024 07:16 AM    CO2 24 08/16/2024 07:16 AM    BUN 23 08/16/2024 07:16 AM    CREATININE 0.7 08/16/2024 07:16 AM    LABGLOM 88 08/16/2024 07:16 AM    GLUCOSE 131 08/16/2024 07:16 AM    CALCIUM 7.7 08/16/2024 07:16 AM     Magnesium:  No results found for: \"MG\"  PTT:    Lab Results   Component Value Date/Time    APTT 28.0 08/16/2024 07:16 AM     TSH:  No results found for: \"TSH\"  BNP: No results for input(s): \"BNP\", \"PROBNP\" in the last 72 hours.  BMP:  Lab Results   Component Value Date/Time     08/16/2024 07:16 AM    K 4.0 08/16/2024 07:16 AM     08/16/2024 07:16 AM    CO2 24 08/16/2024 07:16 AM    BUN 23 08/16/2024 07:16 AM    CREATININE 0.7 08/16/2024 07:16 AM    CALCIUM 7.7 08/16/2024 07:16 AM    LABGLOM 88 08/16/2024 07:16 AM    GLUCOSE 131 08/16/2024 07:16 AM     LIVER PROFILE:  Recent Labs     08/16/24  0716   AST 53*   ALT 39*   ALKPHOS 40   BILITOT 0.2*   ALBUMIN 3.0*     FLP:  No results found for: \"CHOL\", \"TRIG\", \"HDL\"      EKG (8/16/2024): Normal sinus rhythm 93 bpm, poor R wave progression, nonspecific T wave abnormalities    Echo (8/16/2024): EF 50 to 55%, apical hypokinesis, mild left atrial enlargement, no significant valve abnormalities    ASSESSMENT:     1.  GI bleed/hemorrhage.  Presenting hemoglobin 5.7  2.  Lactic acidosis, improving.  Presenting lactate 5.1  3.  Mildly elevated troponin, likely demand ischemia in setting of above problems  4.  Essential hypertension        PLAN:     Patient without prior documented cardiac history.  She has mildly elevated troponin and wall motion abnormalities in setting of symptomatic hemorrhage and symptomatic anemia.  She also has received 2 units packed red blood cells    Agree with diuresis, agree with additional Lasix 40 mg IV

## 2024-08-16 NOTE — PROGRESS NOTES
Notified by surgery that they plan to take patient for EGD at 1400. Patient Strict NPO. Patient and family member notified and questions answered

## 2024-08-16 NOTE — PROGRESS NOTES
Transitions of Care Pharmacy Service   Medication Review    The patient's list of current home medications has been reviewed.     Source(s) of information: Patient's daughter; home med bottles; Surescripts    Based on information provided by the above source(s), I have updated the patient's home med list as described below.     Please review the ACTION REQUESTED section of this note below for any discrepancies on current hospital orders.      I changed or updated the following medications on the patient's home medication list:  Removed None     Added Sertraline  Sucralfate  Pantoprazole  Pioglitazone  Metoprolol  Losartan       Adjusted   None   Other Notes Daughter states patient is only on above 6 medications.         PROVIDER ACTION REQUESTED  Medications that need to be addressed by a physician/nurse practitioner:    Medication Action Requested        Home med list has been reviewed and updated for provider reconciliation.         Please feel free to call me with any questions about this encounter. Thank you.    Luis Magdaleno RPH   Transitions of Care Pharmacy Service  Phone:  [phone]  Fax: [phone]      Electronically signed by Luis Magdaleno RPH on 8/16/2024 at 5:36 PM         Medications Prior to Admission:   Sertraline HCl 200 MG CAPS, Take 2 tablets by mouth daily  sucralfate (CARAFATE) 1 GM tablet, Take 1 tablet by mouth 4 times daily  pantoprazole (PROTONIX) 40 MG tablet, Take 1 tablet by mouth 2 times daily  pioglitazone (ACTOS) 30 MG tablet, Take 1 tablet by mouth daily  metoprolol succinate (TOPROL XL) 25 MG extended release tablet, Take 1 tablet by mouth daily  losartan (COZAAR) 100 MG tablet, Take 1 tablet by mouth daily

## 2024-08-16 NOTE — CONSULTS
GASTROENTEROLOGY CONSULT       REASON FOR CONSULT:  Upper GI bleed    REQUESTING PHYSICIAN:  Shelli Gant MD    HISTORY OF PRESENT ILLNESS:    The patient is a 78 y.o. female who presents with hematemesis.  She is a poor historian, but has family at bedside who is able to assist with historical information.  Was transferred from [facility], where she presented with hematemesis.  Found to have a hemoglobin of 5.7 upon admission.  Has been having black-colored stools, then began with nausea and vomiting, vomiting Tuesday after dinner.  Blood was noted in emesis.  Continued with vomiting yesterday morning, more blood noted.  Has a stated history of PUD, though no known EGD.  Family does not believe that she has ever had a colonoscopy.  Patient complains of diffuse abdominal pain.          MEDICAL HISTORY:   No past medical history on file.    SURGICAL HISTORY:  No past surgical history on file.    MEDS:  Prior to Admission medications    Not on File     Scheduled Meds:   folic acid 1 mg, thiamine (B-1) 100 mg in sodium chloride 0.9 % 50 mL IVPB   IntraVENous Daily    nicotine  1 patch TransDERmal Daily    sodium chloride flush  5-40 mL IntraVENous 2 times per day     Continuous Infusions:   sodium chloride 100 mL/hr at 08/16/24 0934    sodium chloride      pantoprazole 8 mg/hr (08/16/24 0934)    Octreotide Acetate 500 mcg in sodium chloride 0.9 % 100 mL infusion 25 mcg/hr (08/16/24 0934)     PRN Meds:morphine, sodium chloride flush, sodium chloride, ondansetron **OR** ondansetron, acetaminophen **OR** acetaminophen, potassium chloride **OR** potassium alternative oral replacement **OR** potassium chloride    ALLERGIES:  Not on File    SOCIAL HISTORY:        FAMILY HISTORY:   No family history on file.    REVIEW OF SYSTEMS:  Difficult to obtain d/t mental status.      PHYSICAL EXAM:    BP (!) 145/57   Pulse 96   Temp 97.5 °F (36.4 °C) (Oral)   Resp 11   Ht 1.524 m (5')   Wt 54.2 kg (119 lb 7.8

## 2024-08-16 NOTE — PLAN OF CARE
Problem: Discharge Planning  Goal: Discharge to home or other facility with appropriate resources  Outcome: Progressing  Flowsheets (Taken 8/15/2024 2000)  Discharge to home or other facility with appropriate resources:   Identify barriers to discharge with patient and caregiver   Arrange for needed discharge resources and transportation as appropriate   Identify discharge learning needs (meds, wound care, etc)     Problem: Skin/Tissue Integrity  Goal: Absence of new skin breakdown  Description: 1.  Monitor for areas of redness and/or skin breakdown  2.  Assess vascular access sites hourly  3.  Every 4-6 hours minimum:  Change oxygen saturation probe site  4.  Every 4-6 hours:  If on nasal continuous positive airway pressure, respiratory therapy assess nares and determine need for appliance change or resting period.  Outcome: Progressing     Problem: Safety - Adult  Goal: Free from fall injury  Outcome: Progressing     Problem: Pain  Goal: Verbalizes/displays adequate comfort level or baseline comfort level  Outcome: Progressing

## 2024-08-16 NOTE — PROGRESS NOTES
Spiritual Health Assessment/Progress Note  [facility]    (P) Loneliness/Social Isolation,  ,  ,      Name: Rosa Maria Joya MRN: [id number]    Age: 78 y.o.     Sex: female   Language: English   Mormonism: No Denominational on file   Upper GI bleed     Date: 8/16/2024            Total Time Calculated: (P) 8 min              Spiritual Assessment began in [facility] ICU        Referral/Consult From: (P) Multi-disciplinary team   Encounter Overview/Reason: (P) Loneliness/Social Isolation  Service Provided For: (P) Patient and family together  Patient values prayer.  Mitra, Belief, Meaning:   Patient identifies as spiritual and has beliefs or practices that help with coping during difficult times  Family/Friends have beliefs or practices that help with coping during difficult times      Importance and Influence:  Patient has spiritual/personal beliefs that influence decisions regarding their health  Family/Friends have spiritual/personal beliefs that influence decisions regarding the patient's health    Community:  Patient feels well-supported. Support system includes: Children  Family/Friends feel well-supported. Support system includes: Spouse/Partner    Assessment and Plan of Care:     Patient Interventions include: Facilitated expression of thoughts and feelings, Affirmed coping skills/support systems, and Engaged in life review and/or legacy  Family/Friends Interventions include: Affirmed coping skills/support systems and Engaged in life review and/or legacy    Patient Plan of Care: Spiritual Care available upon further referral  Family/Friends Plan of Care: Spiritual Care available upon further referral    Electronically signed by Chaplain Alberto on 8/16/2024 at 12:55 PM

## 2024-08-16 NOTE — PROGRESS NOTES
Pt oriented to room and call light system. Admission questions done with daughter at bedside. Pt is DNRCCA and was initially ordered as full code. Code status was confirmed with pt that she wanted to be DNRCCA but then when writer checked orders it had already been fixed to DNRCCA. Pt has one working IV. Multiple attempts were made to get other IV's inserted but unsuccessful. Waiting for Ultra sound guided IV to be inserted. For now current drips will have to be held while blood is being administered.

## 2024-08-16 NOTE — PROGRESS NOTES
Patient seen in recovery post EGD.  It was noted that patient had abdominal distention with discomfort after the procedure.  Hemostasis was achieved regarding actively bleeding visible vessel in the duodenum.  Acute abdominal series was then obtained showing no evidence for perforation.  Possible ileus with a large amount of air seen.  NG tube was placed by myself in the PACU at bedside.  Low intermittent suction was carried out and the abdomen did gradually deflate.  Imaging also suggested worsening heart failure.  She received 20 mg of Lasix.  I spent a good amount of time speaking with the patient as well as the nurse in PACU.  I also updated the ICU nurse who will be accepting the patient back.  I informed her of the specific issues as transcribed above.  Nurse also updated family.  Nurse will contact cardiology so that they are aware of the heart failure as well as dyspnea.    Van South D.O.

## 2024-08-16 NOTE — PLAN OF CARE
Problem: Skin/Tissue Integrity  Goal: Absence of new skin breakdown  Description: 1.  Monitor for areas of redness and/or skin breakdown  2.  Assess vascular access sites hourly  3.  Every 4-6 hours minimum:  Change oxygen saturation probe site  4.  Every 4-6 hours:  If on nasal continuous positive airway pressure, respiratory therapy assess nares and determine need for appliance change or resting period.  8/16/2024 1413 by Mitzy Bright RN  Outcome: Progressing     Problem: Safety - Adult  Goal: Free from fall injury  8/16/2024 1413 by Mitzy Bright RN  Outcome: Progressing     Problem: Pain  Goal: Verbalizes/displays adequate comfort level or baseline comfort level  8/16/2024 1413 by Mitzy Bright RN  Outcome: Progressing  Flowsheets  Taken 8/16/2024 1200  Verbalizes/displays adequate comfort level or baseline comfort level:   Encourage patient to monitor pain and request assistance   Assess pain using appropriate pain scale  Taken 8/16/2024 0800  Verbalizes/displays adequate comfort level or baseline comfort level:   Encourage patient to monitor pain and request assistance   Assess pain using appropriate pain scale     Problem: Cardiovascular - Adult  Goal: Maintains optimal cardiac output and hemodynamic stability  Outcome: Progressing  Flowsheets (Taken 8/16/2024 0800)  Maintains optimal cardiac output and hemodynamic stability: Monitor blood pressure and heart rate     Problem: Skin/Tissue Integrity - Adult  Goal: Skin integrity remains intact  Outcome: Progressing  Flowsheets (Taken 8/16/2024 0800)  Skin Integrity Remains Intact: Monitor for areas of redness and/or skin breakdown     Problem: Musculoskeletal - Adult  Goal: Return mobility to safest level of function  Outcome: Progressing  Flowsheets (Taken 8/16/2024 0800)  Return Mobility to Safest Level of Function:   Assess patient stability and activity tolerance for standing, transferring and ambulating with or without assistive devices

## 2024-08-16 NOTE — PROGRESS NOTES
End Of Shift Note  [place] ICU  Summary of shift: Pt oriented/disoriented at times. No blood was given. Protonix, octreotide and normal saline infusing. Pt had no BM, nausea or vomiting. Last Hgb 9.4. No active bleeding. Pt soaked 3 briefs. Morphine ordered to help with pain from rib fractures. Pt very restless throughout night and was taking equipment and oxygen off multiple times.     Vitals:    Vitals:    08/16/24 0200 08/16/24 0300 08/16/24 0400 08/16/24 0524   BP: (!) 132/52 106/85 (!) 136/105 (!) 147/67   Pulse: 92 96 99 (!) 108   Resp: 16 18 17 14   Temp:   97.8 °F (36.6 °C)    TempSrc:   Temporal    SpO2: 97% 90% 90%    Weight:       Height:            I&O:   Intake/Output Summary (Last 24 hours) at 8/16/2024 0731  Last data filed at 8/16/2024 0411  Gross per 24 hour   Intake --   Output 100 ml   Net -100 ml       Resp Status: 4L NC    Ventilator Settings:     / / /     Critical Care IV infusions:   sodium chloride 100 mL/hr at 08/16/24 0532    sodium chloride      pantoprazole 8 mg/hr (08/16/24 0226)    Octreotide Acetate 500 mcg in sodium chloride 0.9 % 100 mL infusion 25 mcg/hr (08/15/24 2020)        LDA:   Peripheral IV 08/15/24 Right Forearm (Active)   Number of days: 0       Peripheral IV 08/16/24 Left (Active)   Number of days: 0       External Urinary Catheter (Active)   Number of days: 0

## 2024-08-16 NOTE — PROGRESS NOTES
Report received from pacu RN. Patient returned to room 1115. Alert, oriented, sinus tach , /63. NG connected to LIWS. Patient educated on NPO diet, NG and purwick. Orders received from attending for PICC line placement. House supervisor notified, access RN being called. Family at bedside, updated and questions answered.

## 2024-08-16 NOTE — PROGRESS NOTES
Patient to surgery for EGD. Report given to RN, patient and family members questions answered. Family member with patient to surgery.

## 2024-08-16 NOTE — PROGRESS NOTES
Called ER again to ask if someone could help get another IV in and they are still busy. Pt hgb 9.2 with no active bleeding. NP asked if they still want the ordered 2u PRBC to be given. Response \"Hold please.\"

## 2024-08-16 NOTE — PROGRESS NOTES
Med rec requested for patient's home medications. Family brought in home medications. Home medication list pending

## 2024-08-17 ENCOUNTER — APPOINTMENT (OUTPATIENT)
Dept: GENERAL RADIOLOGY | Age: 79
End: 2024-08-17
Attending: INTERNAL MEDICINE
Payer: MEDICARE

## 2024-08-17 LAB
ALBUMIN SERPL-MCNC: 3 G/DL (ref 3.5–5.2)
ALP SERPL-CCNC: 40 U/L (ref 35–104)
ALT SERPL-CCNC: 32 U/L (ref 5–33)
AMMONIA PLAS-SCNC: 29 UMOL/L (ref 11–51)
ANION GAP SERPL CALCULATED.3IONS-SCNC: 12 MMOL/L (ref 9–17)
ANION GAP SERPL CALCULATED.3IONS-SCNC: 13 MMOL/L (ref 9–17)
AST SERPL-CCNC: 35 U/L
BILIRUB SERPL-MCNC: 0.2 MG/DL (ref 0.3–1.2)
BUN SERPL-MCNC: 18 MG/DL (ref 8–23)
BUN SERPL-MCNC: 21 MG/DL (ref 8–23)
BUN/CREAT SERPL: 30 (ref 9–20)
BUN/CREAT SERPL: 35 (ref 9–20)
CA-I BLD-SCNC: 1.06 MMOL/L (ref 1.15–1.33)
CALCIUM SERPL-MCNC: 7.4 MG/DL (ref 8.6–10.4)
CALCIUM SERPL-MCNC: 8.3 MG/DL (ref 8.6–10.4)
CHLORIDE SERPL-SCNC: 109 MMOL/L (ref 98–107)
CHLORIDE SERPL-SCNC: 109 MMOL/L (ref 98–107)
CO2 SERPL-SCNC: 26 MMOL/L (ref 20–31)
CO2 SERPL-SCNC: 27 MMOL/L (ref 20–31)
CREAT SERPL-MCNC: 0.6 MG/DL (ref 0.5–0.9)
CREAT SERPL-MCNC: 0.6 MG/DL (ref 0.5–0.9)
EKG ATRIAL RATE: 90 BPM
EKG ATRIAL RATE: 93 BPM
EKG P AXIS: 58 DEGREES
EKG P AXIS: 68 DEGREES
EKG P-R INTERVAL: 154 MS
EKG P-R INTERVAL: 156 MS
EKG Q-T INTERVAL: 372 MS
EKG Q-T INTERVAL: 386 MS
EKG QRS DURATION: 90 MS
EKG QRS DURATION: 90 MS
EKG QTC CALCULATION (BAZETT): 462 MS
EKG QTC CALCULATION (BAZETT): 472 MS
EKG R AXIS: 85 DEGREES
EKG R AXIS: 88 DEGREES
EKG T AXIS: 41 DEGREES
EKG T AXIS: 60 DEGREES
EKG VENTRICULAR RATE: 90 BPM
EKG VENTRICULAR RATE: 93 BPM
GFR, ESTIMATED: >90 ML/MIN/1.73M2
GFR, ESTIMATED: >90 ML/MIN/1.73M2
GLUCOSE SERPL-MCNC: 133 MG/DL (ref 70–99)
GLUCOSE SERPL-MCNC: 136 MG/DL (ref 70–99)
HCT VFR BLD AUTO: 24.5 % (ref 36.3–47.1)
HCT VFR BLD AUTO: 31 % (ref 36.3–47.1)
HCT VFR BLD AUTO: 32.1 % (ref 36.3–47.1)
HCT VFR BLD AUTO: 32.4 % (ref 36.3–47.1)
HCT VFR BLD AUTO: 32.9 % (ref 36.3–47.1)
HGB BLD-MCNC: 10.3 G/DL (ref 11.9–15.1)
HGB BLD-MCNC: 10.4 G/DL (ref 11.9–15.1)
HGB BLD-MCNC: 10.6 G/DL (ref 11.9–15.1)
HGB BLD-MCNC: 7.7 G/DL (ref 11.9–15.1)
HGB BLD-MCNC: 9.9 G/DL (ref 11.9–15.1)
LACTATE BLDV-SCNC: 0.7 MMOL/L (ref 0.5–2.2)
MAGNESIUM SERPL-MCNC: 1.5 MG/DL (ref 1.6–2.6)
MAGNESIUM SERPL-MCNC: 2.3 MG/DL (ref 1.6–2.6)
PHOSPHATE SERPL-MCNC: 1.9 MG/DL (ref 2.6–4.5)
PHOSPHATE SERPL-MCNC: 2 MG/DL (ref 2.6–4.5)
POTASSIUM SERPL-SCNC: 3.3 MMOL/L (ref 3.7–5.3)
POTASSIUM SERPL-SCNC: 3.7 MMOL/L (ref 3.7–5.3)
PROT SERPL-MCNC: 4.8 G/DL (ref 6.4–8.3)
SODIUM SERPL-SCNC: 148 MMOL/L (ref 135–144)
SODIUM SERPL-SCNC: 148 MMOL/L (ref 135–144)

## 2024-08-17 PROCEDURE — 82330 ASSAY OF CALCIUM: CPT

## 2024-08-17 PROCEDURE — 80053 COMPREHEN METABOLIC PANEL: CPT

## 2024-08-17 PROCEDURE — 6360000002 HC RX W HCPCS: Performed by: INTERNAL MEDICINE

## 2024-08-17 PROCEDURE — 2580000003 HC RX 258: Performed by: INTERNAL MEDICINE

## 2024-08-17 PROCEDURE — 99232 SBSQ HOSP IP/OBS MODERATE 35: CPT | Performed by: INTERNAL MEDICINE

## 2024-08-17 PROCEDURE — 83735 ASSAY OF MAGNESIUM: CPT

## 2024-08-17 PROCEDURE — 80048 BASIC METABOLIC PNL TOTAL CA: CPT

## 2024-08-17 PROCEDURE — 84100 ASSAY OF PHOSPHORUS: CPT

## 2024-08-17 PROCEDURE — 83605 ASSAY OF LACTIC ACID: CPT

## 2024-08-17 PROCEDURE — 85014 HEMATOCRIT: CPT

## 2024-08-17 PROCEDURE — 74018 RADEX ABDOMEN 1 VIEW: CPT

## 2024-08-17 PROCEDURE — 2500000003 HC RX 250 WO HCPCS: Performed by: NURSE PRACTITIONER

## 2024-08-17 PROCEDURE — 6360000002 HC RX W HCPCS: Performed by: NURSE PRACTITIONER

## 2024-08-17 PROCEDURE — P9016 RBC LEUKOCYTES REDUCED: HCPCS

## 2024-08-17 PROCEDURE — 30233N1 TRANSFUSION OF NONAUTOLOGOUS RED BLOOD CELLS INTO PERIPHERAL VEIN, PERCUTANEOUS APPROACH: ICD-10-PCS | Performed by: INTERNAL MEDICINE

## 2024-08-17 PROCEDURE — 2580000003 HC RX 258: Performed by: NURSE PRACTITIONER

## 2024-08-17 PROCEDURE — 2700000000 HC OXYGEN THERAPY PER DAY

## 2024-08-17 PROCEDURE — 36415 COLL VENOUS BLD VENIPUNCTURE: CPT

## 2024-08-17 PROCEDURE — 94761 N-INVAS EAR/PLS OXIMETRY MLT: CPT

## 2024-08-17 PROCEDURE — 85018 HEMOGLOBIN: CPT

## 2024-08-17 PROCEDURE — 2000000000 HC ICU R&B

## 2024-08-17 PROCEDURE — 82140 ASSAY OF AMMONIA: CPT

## 2024-08-17 PROCEDURE — 36430 TRANSFUSION BLD/BLD COMPNT: CPT

## 2024-08-17 PROCEDURE — 2500000003 HC RX 250 WO HCPCS: Performed by: INTERNAL MEDICINE

## 2024-08-17 RX ORDER — DICYCLOMINE HYDROCHLORIDE 10 MG/ML
20 INJECTION INTRAMUSCULAR ONCE
Status: COMPLETED | OUTPATIENT
Start: 2024-08-17 | End: 2024-08-17

## 2024-08-17 RX ORDER — METOPROLOL TARTRATE 1 MG/ML
5 INJECTION, SOLUTION INTRAVENOUS EVERY 4 HOURS PRN
Status: DISCONTINUED | OUTPATIENT
Start: 2024-08-17 | End: 2024-08-24 | Stop reason: HOSPADM

## 2024-08-17 RX ORDER — POTASSIUM CHLORIDE 7.45 MG/ML
10 INJECTION INTRAVENOUS PRN
Status: DISCONTINUED | OUTPATIENT
Start: 2024-08-17 | End: 2024-08-24 | Stop reason: HOSPADM

## 2024-08-17 RX ORDER — MAGNESIUM SULFATE IN WATER 40 MG/ML
2000 INJECTION, SOLUTION INTRAVENOUS PRN
Status: DISCONTINUED | OUTPATIENT
Start: 2024-08-17 | End: 2024-08-24 | Stop reason: HOSPADM

## 2024-08-17 RX ORDER — FUROSEMIDE 10 MG/ML
20 INJECTION INTRAMUSCULAR; INTRAVENOUS ONCE
Status: COMPLETED | OUTPATIENT
Start: 2024-08-17 | End: 2024-08-17

## 2024-08-17 RX ORDER — CALCIUM GLUCONATE 20 MG/ML
2000 INJECTION, SOLUTION INTRAVENOUS ONCE
Status: COMPLETED | OUTPATIENT
Start: 2024-08-17 | End: 2024-08-17

## 2024-08-17 RX ADMIN — METOPROLOL TARTRATE 5 MG: 5 INJECTION INTRAVENOUS at 20:32

## 2024-08-17 RX ADMIN — PANTOPRAZOLE SODIUM 8 MG/HR: 40 INJECTION, POWDER, FOR SOLUTION INTRAVENOUS at 09:44

## 2024-08-17 RX ADMIN — POTASSIUM CHLORIDE 10 MEQ: 7.46 INJECTION, SOLUTION INTRAVENOUS at 07:03

## 2024-08-17 RX ADMIN — SODIUM PHOSPHATE, MONOBASIC, MONOHYDRATE AND SODIUM PHOSPHATE, DIBASIC, ANHYDROUS 8.67 MMOL: 142; 276 INJECTION, SOLUTION INTRAVENOUS at 05:11

## 2024-08-17 RX ADMIN — SODIUM CHLORIDE, PRESERVATIVE FREE 10 ML: 5 INJECTION INTRAVENOUS at 19:12

## 2024-08-17 RX ADMIN — POTASSIUM CHLORIDE 10 MEQ: 7.46 INJECTION, SOLUTION INTRAVENOUS at 08:08

## 2024-08-17 RX ADMIN — POTASSIUM CHLORIDE 10 MEQ: 7.46 INJECTION, SOLUTION INTRAVENOUS at 05:59

## 2024-08-17 RX ADMIN — FOLIC ACID 1 MG: 5 INJECTION, SOLUTION INTRAMUSCULAR; INTRAVENOUS; SUBCUTANEOUS at 08:10

## 2024-08-17 RX ADMIN — SODIUM CHLORIDE, PRESERVATIVE FREE 10 ML: 5 INJECTION INTRAVENOUS at 08:11

## 2024-08-17 RX ADMIN — MORPHINE SULFATE 2 MG: 2 INJECTION, SOLUTION INTRAMUSCULAR; INTRAVENOUS at 13:30

## 2024-08-17 RX ADMIN — PANTOPRAZOLE SODIUM 8 MG/HR: 40 INJECTION, POWDER, FOR SOLUTION INTRAVENOUS at 19:06

## 2024-08-17 RX ADMIN — METOPROLOL TARTRATE 5 MG: 5 INJECTION INTRAVENOUS at 06:04

## 2024-08-17 RX ADMIN — MAGNESIUM SULFATE HEPTAHYDRATE 2000 MG: 40 INJECTION, SOLUTION INTRAVENOUS at 05:58

## 2024-08-17 RX ADMIN — SODIUM CHLORIDE: 9 INJECTION, SOLUTION INTRAVENOUS at 09:30

## 2024-08-17 RX ADMIN — DICYCLOMINE HYDROCHLORIDE 20 MG: 10 INJECTION, SOLUTION INTRAMUSCULAR at 15:36

## 2024-08-17 RX ADMIN — METOPROLOL TARTRATE 5 MG: 5 INJECTION INTRAVENOUS at 13:22

## 2024-08-17 RX ADMIN — CALCIUM GLUCONATE 2000 MG: 20 INJECTION, SOLUTION INTRAVENOUS at 09:49

## 2024-08-17 RX ADMIN — POTASSIUM CHLORIDE 10 MEQ: 7.46 INJECTION, SOLUTION INTRAVENOUS at 04:48

## 2024-08-17 RX ADMIN — FUROSEMIDE 20 MG: 10 INJECTION, SOLUTION INTRAMUSCULAR; INTRAVENOUS at 06:37

## 2024-08-17 RX ADMIN — THIAMINE HYDROCHLORIDE 100 MG: 100 INJECTION, SOLUTION INTRAMUSCULAR; INTRAVENOUS at 08:11

## 2024-08-17 ASSESSMENT — PAIN SCALES - WONG BAKER: WONGBAKER_NUMERICALRESPONSE: HURTS A LITTLE BIT

## 2024-08-17 ASSESSMENT — PAIN DESCRIPTION - LOCATION
LOCATION: ABDOMEN
LOCATION: ABDOMEN

## 2024-08-17 ASSESSMENT — PAIN DESCRIPTION - DESCRIPTORS
DESCRIPTORS: ACHING;GNAWING
DESCRIPTORS: ACHING

## 2024-08-17 ASSESSMENT — PAIN SCALES - GENERAL
PAINLEVEL_OUTOF10: 7
PAINLEVEL_OUTOF10: 6
PAINLEVEL_OUTOF10: 5

## 2024-08-17 ASSESSMENT — PAIN DESCRIPTION - ORIENTATION
ORIENTATION: RIGHT;LEFT;MID
ORIENTATION: RIGHT;LEFT;MID

## 2024-08-17 ASSESSMENT — PAIN - FUNCTIONAL ASSESSMENT
PAIN_FUNCTIONAL_ASSESSMENT: ACTIVITIES ARE NOT PREVENTED
PAIN_FUNCTIONAL_ASSESSMENT: ACTIVITIES ARE NOT PREVENTED

## 2024-08-17 NOTE — PROGRESS NOTES
End Of Shift Note  [place] ICU  Summary of shift: 40 Lasix given at beginning of shift. PICC placed in R arm d/t poor IV access. Patient initially Aox2 calm and cooperative, regressed to alert to self and impulsive around 2200. Patient pulled NG and was picking at PICC dressing. Dr. South aware of pulled NG, no reinsertion, KUB in AM. Bilateral mitts ordered and placed to prevent patient from pulling at lines. Family notified. Checking H+H Q4, trending down (10.0, 8.6, 7.7), vitals stable. Dr. South notified of HGB, ordered to give 2 units PRBCs along with 20 of lasix to be given in between blood transfusions. Replacing electrolytes per NP orders and scale. 1425ml UOP + 2 wet briefs. Blood pressures elevating towards end of shift, 5mg PRN lopressor ordered and given. BM this morning, liquid/tarry black, few blood clots. Morning KUB completed.     Vitals:    Vitals:    08/17/24 0300 08/17/24 0400 08/17/24 0439 08/17/24 0500   BP: (!) 157/63 (!) 172/73 (!) 167/66 (!) 157/72   Pulse: (!) 111 (!) 110 (!) 109 (!) 106   Resp: 10 17 11 (!) 9   Temp:  97.7 °F (36.5 °C) 98.7 °F (37.1 °C) 98.7 °F (37.1 °C)   TempSrc:  Temporal Oral Oral   SpO2: 94% 91% 93% 94%   Weight:    50.7 kg (111 lb 12.4 oz)   Height:            I&O:   Intake/Output Summary (Last 24 hours) at 8/17/2024 0523  Last data filed at 8/17/2024 0000  Gross per 24 hour   Intake 2488.83 ml   Output 1700 ml   Net 788.83 ml       Resp Status: 3L NC    Ventilator Settings:     / / /     Critical Care IV infusions:   sodium chloride 100 mL/hr at 08/16/24 2229    sodium chloride      pantoprazole 8 mg/hr (08/16/24 2227)        LDA:   PICC 08/16/24 Right Brachial (Active)   Number of days: 0       Peripheral IV 08/16/24 Left (Active)   Number of days: 1       External Urinary Catheter (Active)   Number of days: 1

## 2024-08-17 NOTE — PLAN OF CARE
Problem: Skin/Tissue Integrity  Goal: Absence of new skin breakdown  Description: 1.  Monitor for areas of redness and/or skin breakdown  2.  Assess vascular access sites hourly  3.  Every 4-6 hours minimum:  Change oxygen saturation probe site  4.  Every 4-6 hours:  If on nasal continuous positive airway pressure, respiratory therapy assess nares and determine need for appliance change or resting period.  8/17/2024 1420 by Lavern Marroquin RN  Outcome: Progressing     Problem: Safety - Adult  Goal: Free from fall injury  8/17/2024 1420 by Lavern Marroquin RN  Outcome: Progressing     Problem: Skin/Tissue Integrity - Adult  Goal: Skin integrity remains intact  8/17/2024 1420 by Lavern Marroquin RN  Outcome: Progressing     Problem: Gastrointestinal - Adult  Goal: Minimal or absence of nausea and vomiting  8/17/2024 1420 by Lavern Marroquin RN  Outcome: Progressing     Problem: Metabolic/Fluid and Electrolytes - Adult  Goal: Electrolytes maintained within normal limits  8/17/2024 1420 by Lavern Marroquin RN  Outcome: Progressing     Problem: Chronic Conditions and Co-morbidities  Goal: Patient's chronic conditions and co-morbidity symptoms are monitored and maintained or improved  8/17/2024 1420 by Lavern Marroquin RN  Outcome: Progressing     Problem: Safety - Medical Restraint  Goal: Remains free of injury from restraints (Restraint for Interference with Medical Device)  Description: INTERVENTIONS:  1. Determine that other, less restrictive measures have been tried or would not be effective before applying the restraint  2. Evaluate the patient's condition at the time of restraint application  3. Inform patient/family regarding the reason for restraint  4. Q2H: Monitor safety, psychosocial status, comfort, nutrition and hydration  8/17/2024 1420 by Lavern Marroquin RN  Outcome: Progressing     Problem: Gastrointestinal - Adult  Goal: Maintains or returns to baseline bowel function  8/17/2024 0700 by Jaskaran Chavarria RN  Outcome:

## 2024-08-17 NOTE — PROGRESS NOTES
GASTROENTEROLOGY NOTE       Patient:   Rosa Maria Joya   :    1945   Facility:   [facility]  Date:     2024  Consultant:   Van South MD, DO      SUBJECTIVE:    78 y.o. female admitted 8/15/2024 with Upper GI bleed [K92.2].      Patient with hematochezia x 1 this morning per patient.   Hb stable after admission.  Patient confused but comfortable appearing.          OBJECTIVE:   Vital Signs:  BP (!) 134/99   Pulse 88   Temp 98.3 °F (36.8 °C) (Oral)   Resp (!) 9   Ht 1.524 m (5')   Wt 50.7 kg (111 lb 12.4 oz)   SpO2 95%   BMI 21.83 kg/m²      Physical Exam:   General appearance: Conversant but confused.  Lungs: CTA anteriorly, unlabored pattern  Heart: regular rate.  Abdomen: Soft, NT, ND +BS, no masses  Skin/Musculoskeletal:  No jaundice. No clubbing, cyanosis, or edema.  ROM normal.      Lab and Imaging Review   Recent Labs     08/15/24  2144 08/15/24  2346 24  0716 24  1116 24  1256 24  1532 24  1853 24  2250 24  0235 24  0748   WBC 16.8*  --   --   --   --   --   --   --   --   --    HGB 9.2*   < > 9.1*   < > 8.9* 9.9* 10.0* 8.6* 7.7* 10.4*   MCV 89.1  --   --   --   --   --   --   --   --   --      --   --   --   --   --   --   --   --   --    INR  --   --  1.2  --   --   --   --   --   --   --      --  144  --   --   --   --   --  148*  --    K 3.6*  --  4.0  --   --   --   --   --  3.3*  --      --  110*  --   --   --   --   --  109*  --    CO2 23  --  24  --   --   --   --   --  26  --    BUN 26*  --  23  --   --   --   --   --  21  --    CREATININE 0.8  --  0.7  --   --   --   --   --  0.6  --    GLUCOSE 118*  --  131*  --   --   --   --   --  133*  --    CALCIUM 7.8*  --  7.7*  --   --   --   --   --  7.4*  --    AST  --   --  53*  --   --   --   --   --  35*  --    ALT  --   --  39*  --   --   --   --   --  32  --    ALKPHOS  --   --  40  --   --   --   --   --  40  --    BILITOT  --   --  0.2*  --   --    --   --   --  0.2*  --    MG  --   --   --   --   --   --   --   --  1.5*  --     < > = values in this interval not displayed.     Recent Labs     08/16/24  0716   INR 1.2   PROTIME 15.4*       EGD yesterday:  Findings::   Esophagus: Mild schatzki ring.   Stomach: Large amount of fresh blood with clots in body.  Irrigated/suctioned/cleared.  No lesions seen.  Duodenum: Actively bleeding/spurting prominent vessel at spot just between 1st/2nd part of duodenum.  Suspect Dieulafoy lesion vs small ulcer with exposed vessel.  4 cc epi injection and bipolar cauterization.  Hemostasis achieved.           Impression:    Massive acute upper GI bleed secondary to actively bleeding exposed vessel in the duodenum (see above)  Acute blood loss anemia  History of alcohol cirrhosis.  Confusion.  Possible hepatic encephalopathy.      PLAN:    Continue PPI drip.  Follow hemoglobin and hematocrit closely.  Transfuse as needed.  Second Look EGD if needed based on overall clinical status.  Keep n.p.o. for now.  Check ammonia.  Lactulose if elevated.  Discussed with nurse.      Van South D.O.

## 2024-08-17 NOTE — PROGRESS NOTES
Section of Cardiology  Progress Note      Date:  8/17/2024  Patient: Rosa Maria Joya  Admission:  8/15/2024  6:31 PM  Admit DX: Upper GI bleed [K92.2]  Age:  78 y.o., 1945     LOS: 2 days     Reason for evaluation:   Troponin elevation, HTN      SUBJECTIVE:     The patient was seen and examined. Notes and labs reviewed.  Pt stable in bed  Is confused and in restraints  BP has been borderline and getting IV lopressor  HR stable  Not on PO meds yet  No complaints of CP, SOB, palpitations, edema      OBJECTIVE:      EXAM:   Vitals:    VITALS:  BP (!) 153/69   Pulse 96   Temp 98.3 °F (36.8 °C) (Oral)   Resp 14   Ht 1.524 m (5')   Wt 50.7 kg (111 lb 12.4 oz)   SpO2 95%   BMI 21.83 kg/m²   24HR INTAKE/OUTPUT:    Intake/Output Summary (Last 24 hours) at 8/17/2024 0939  Last data filed at 8/17/2024 0721  Gross per 24 hour   Intake 3118.79 ml   Output 1875 ml   Net 1243.79 ml       CONSTITUTIONAL:  awake, alert, confused, no distress  HEENT: Normal jugular venous pulsations, no carotid bruits.   LUNGS: Good respiratory effort On auscultation: clear to auscultation bilaterally  CARDIOVASCULAR:  Normal apical impulse, regular rate and rhythm, normal S1 and S2, no S3 or S4, and no murmur or rub noted.  ABDOMEN: Soft, nontender, nondistended.   SKIN: Warm and dry.  EXTREMITIES:No lower extremity edema.     Current Inpatient Medications:   calcium gluconate  2,000 mg IntraVENous Once    nicotine  1 patch TransDERmal Daily    thiamine  100 mg IntraMUSCular Daily    folic acid 1 mg in sodium chloride 0.9 % 50 mL IVPB  1 mg IntraVENous Daily    sodium chloride flush  5-40 mL IntraVENous 2 times per day       IV Infusions (if any):   sodium chloride 100 mL/hr at 08/17/24 0930    sodium chloride      pantoprazole 8 mg/hr (08/17/24 0721)       Diagnostics:   Telemetry: NSR      Labs:   CBC:  Recent Labs     08/15/24  2144 08/15/24  2346 08/17/24  0235 08/17/24  0748   WBC 16.8*  --   --   --    HGB 9.2*   < > 7.7*

## 2024-08-17 NOTE — PLAN OF CARE
Problem: Discharge Planning  Goal: Discharge to home or other facility with appropriate resources  Outcome: Progressing     Problem: Skin/Tissue Integrity  Goal: Absence of new skin breakdown  Description: 1.  Monitor for areas of redness and/or skin breakdown  2.  Assess vascular access sites hourly  3.  Every 4-6 hours minimum:  Change oxygen saturation probe site  4.  Every 4-6 hours:  If on nasal continuous positive airway pressure, respiratory therapy assess nares and determine need for appliance change or resting period.  Outcome: Progressing     Problem: Safety - Adult  Goal: Free from fall injury  Outcome: Progressing     Problem: Pain  Goal: Verbalizes/displays adequate comfort level or baseline comfort level  Outcome: Progressing     Problem: Neurosensory - Adult  Goal: Achieves stable or improved neurological status  Outcome: Progressing  Goal: Achieves maximal functionality and self care  Outcome: Progressing     Problem: Cardiovascular - Adult  Goal: Maintains optimal cardiac output and hemodynamic stability  Outcome: Progressing     Problem: Skin/Tissue Integrity - Adult  Goal: Skin integrity remains intact  Outcome: Progressing     Problem: Musculoskeletal - Adult  Goal: Return mobility to safest level of function  Outcome: Progressing  Goal: Return ADL status to a safe level of function  Outcome: Progressing     Problem: Gastrointestinal - Adult  Goal: Minimal or absence of nausea and vomiting  Outcome: Progressing  Goal: Maintains or returns to baseline bowel function  Outcome: Not Progressing     Problem: Genitourinary - Adult  Goal: Absence of urinary retention  Outcome: Progressing     Problem: Infection - Adult  Goal: Absence of infection during hospitalization  Outcome: Progressing     Problem: Metabolic/Fluid and Electrolytes - Adult  Goal: Electrolytes maintained within normal limits  Outcome: Progressing     Problem: Hematologic - Adult  Goal: Maintains hematologic stability  Outcome: Not  measures have been tried or would not be effective before applying the restraint   Every 2 hours: Monitor safety, psychosocial status, comfort, nutrition and hydration  Taken 8/16/2024 9997 by Mason Esposito RN  Remains free of injury from restraints (restraint for interference with medical device):   Determine that other, less restrictive measures have been tried or would not be effective before applying the restraint   Evaluate the patient's condition at the time of restraint application   Inform patient/family regarding the reason for restraint   Every 2 hours: Monitor safety, psychosocial status, comfort, nutrition and hydration     Problem: Gastrointestinal - Adult  Goal: Maintains or returns to baseline bowel function  Outcome: Not Progressing     Problem: Hematologic - Adult  Goal: Maintains hematologic stability  Outcome: Not Progressing

## 2024-08-17 NOTE — CARE COORDINATION
Case Management Assessment  Initial Evaluation    Date/Time of Evaluation: 8/17/2024 1:46 PM  Assessment Completed by: GRISELDA WILSON RN    If patient is discharged prior to next notation, then this note serves as note for discharge by case management.    Patient Name: Rosa Maria Joya                   YOB: 1945  Diagnosis: Upper GI bleed [K92.2]                   Date / Time: 8/15/2024  6:31 PM    Patient Admission Status: Inpatient   Readmission Risk (Low < 19, Mod (19-27), High > 27): Readmission Risk Score: 13.5    Current PCP: No primary care provider on file.  PCP verified by CM? Yes ([facility] - Concha Ku - 2 wks ago.)    Chart Reviewed: Yes      History Provided by: Patient  Patient Orientation: Alert and Oriented, Person, Place, Situation, Self    Patient Cognition: Alert    Hospitalization in the last 30 days (Readmission):  No    If yes, Readmission Assessment in CM Navigator will be completed.    Advance Directives:      Code Status: DNR-CCA   Patient's Primary Decision Maker is: Legal Next of Kin      Discharge Planning:    Patient lives with: Alone Type of Home: House  Primary Care Giver: Self  Patient Support Systems include: Children   Current Financial resources: None  Current community resources: None  Current services prior to admission: None            Current DME:              Type of Home Care services:  PT, OT, Skilled Therapy    ADLS  Prior functional level: Independent in ADLs/IADLs  Current functional level: Assistance with the following:, Bathing, Dressing, Cooking, Housework, Toileting, Shopping, Mobility    PT AM-PAC:   /24  OT AM-PAC:   /24    Family can provide assistance at DC: Yes  Would you like Case Management to discuss the discharge plan with any other family members/significant others, and if so, who? Yes (daughter)  Plans to Return to Present Housing: Unknown at present  Other Identified Issues/Barriers to RETURNING to current housing: medical

## 2024-08-17 NOTE — PROGRESS NOTES
[facility]  Office: [phone]  Randall Lundberg DO, Delon Robbins DO, Donte Joseph DO, Lexa Gamez DO, Shelli Gant MD, Shruti Soliz MD, Brandon Peter MD, Mery Hutchinson MD,  Rene Alas MD, Malick Chris MD, Mahsa Barrett MD,  Salome Rodriguez DO, Felicity Rangel MD, Mynor Mcmillan MD, Rohan Lundberg DO, Nati Arnold MD,  Antwan Rowan DO, Catrina Terry MD, Reny Olmstead MD, Rupali Trent MD, Raissa Cunningham MD,  Odilon Yee MD, Nick Wade MD, Surekha Ibrahim MD, Vickey Quintero MD, Joseph Pugh MD, Randy Regalado MD, Rene Black DO, Ramiro Munson DO, Quan Ravi MD,  Esteban Aranda MD, Shirley Waterhouse, CNP,  Sallie Romero CNP, Tadeo Sherman, CNP,  Polly Ramsey, POLINA, Loli Beltran, CNP, Natividad Gill, CNP, Yessica Juárez CNP, Marguerite Morelos CNP, Jackeline Levin, PA-C, Sheila Neff PA-C, Jazmine Ashford, CNP, Nathalie Meredith, CNP, Norma Gauthier, CNP, Kristin Lawrence, CNP, Tala Hoover, CNS, Bhavani Rodriguez, TORITO, Gretta Palacios CNP, Tracy Schwab, CNP         [facility]   IN-PATIENT SERVICE   [facility]    Progress Note    8/17/2024    12:53 PM    Name:   Rosa Maria Joya  MRN:     [id number]     Acct:      [id number]   Room:   1109/1109-01   Day:  2  Admit Date:  8/15/2024  6:31 PM    PCP:   No primary care provider on file.  Code Status:  DNR-CCA    Subjective:     C/C: hemetemesis and melena  Interval History Status: .   8/17/24  Patient had EGD yesterday which showed Schatzki's ring and active UGI bleed with visible vessel and duodenum-suspected Dieulafoy lesion versus small ulcer with exposed vessel, epi was injected and hemostasis achieved.  She had hematochezia x 1 today morning  Hemoglobin stable, 10.6  She remains on Protonix drip  GI recommends to keep n.p.o. and plans second look depending on her progress  Patient is pleasant to talk but slightly confused as before      8/16/24  Patient states she had 2 bloody emesis at  epigastric/abdominal pain, + hematemesis and melena,   Neurological:  negative for dizziness, + lightheadedness on exertion, denies headache    Medications:     Allergies:    Allergies   Allergen Reactions    Keflex [Cephalexin]        Current Meds:   Scheduled Meds:    nicotine  1 patch TransDERmal Daily    thiamine  100 mg IntraMUSCular Daily    folic acid 1 mg in sodium chloride 0.9 % 50 mL IVPB  1 mg IntraVENous Daily    sodium chloride flush  5-40 mL IntraVENous 2 times per day     Continuous Infusions:    sodium chloride 100 mL/hr at 24 1004    sodium chloride      pantoprazole 8 mg/hr (24 1004)     PRN Meds: sodium phosphate 8.67 mmol in sodium chloride 0.9 % 250 mL IVPB **OR** sodium phosphate 17.34 mmol in sodium chloride 0.9 % 250 mL IVPB, potassium chloride, magnesium sulfate, metoprolol, morphine, perflutren lipid microspheres, acetaminophen **OR** acetaminophen, sodium chloride flush, sodium chloride, ondansetron **OR** ondansetron    Data:     Past Medical History:   has no past medical history on file.    Social History:   reports that she has been smoking cigarettes. She has never used smokeless tobacco. She reports current alcohol use. She reports that she does not use drugs.     Family History: No family history on file.    Vitals:  BP (!) 143/56   Pulse 89   Temp 97.8 °F (36.6 °C) (Oral)   Resp (!) 9   Ht 1.524 m (5')   Wt 50.7 kg (111 lb 12.4 oz)   SpO2 92%   BMI 21.83 kg/m²   Temp (24hrs), Av °F (36.7 °C), Min:97.3 °F (36.3 °C), Max:98.7 °F (37.1 °C)    Recent Labs     24  1538   POCGLU 123*       I/O (24Hr):    Intake/Output Summary (Last 24 hours) at 2024 1253  Last data filed at 2024 1106  Gross per 24 hour   Intake 3764.76 ml   Output 2725 ml   Net 1039.76 ml       Labs:  Hematology:  Recent Labs     08/15/24  2144 08/15/24  2346 24  0716 24  1116 24  0235 24  0748 24  1145   WBC 16.8*  --   --   --   --   --   --    RBC

## 2024-08-17 NOTE — PROCEDURES
PROCEDURE NOTE  Date: 8/16/2024   Name: Rosa Maria Joya  YOB: 1945    Procedures  Picc placement note:  Dynamic Access RN procedure    Consent signed and obtained by proceduralist, from JANESSA Dewey. See consent form in paper chart.    Prescribed IV Therapy = Protonix gtt, IVF, blood products  Peripheral ultrasound assessment done. Plan for right bracial vein insertion.   CVR measurement = 37 % (Linear CVR is preferred to be less than 45%).  Product type: Bard 5 fr 2 lumen Power PICC.  History/Labs/Allergies Reviewed  Placed By: Valdemar Cook - ZEFERINO, VA-BC (Dynamic Access)  Time out Performed using Two Identifiers  Lot # [id number]  Expiration date = 08-  Trimmed at 33 cm total  External catheter length 0 cm  Number of attempts 1  Special equipment used- Bard 3cg tip confirmation system, ultrasound, and micro-introducer (MST) technique   Catheter securement = adhesive 3M securement device  Dressing applied= Tegaderm CHG  Lidocaine administered intradermally conc.1%, approx 1 ml (Lidocaine Lot# - [id number] and Exp date - 09- )    Jaskaran Chavarria, RN aware picc placed with ECG technology and is confirmed in the distal 1/3 SVC. Picc is immediately released for use. Rn aware new iv tubing required.     PICC education:     [ x ] Discussed with patient/Family or POA prior to procedure.  Risks and Benefits along with reason for procedure were discussed and teaching was reinforced with an education handout on line  insertion. [facility] FAQ Catheter Associated Blood Stream Infections and [facility] [id number] REV. 7/13 Nursing and Booklet left at bedside or in chart. Patient (Family or POA) acknowledged understanding of information taught and agreed to procedure.      [  ] Was not discussed with patient/family or POA due to pts medical status at time of procedure. pts family or POA not available to discuss line education. [facility] FAQ Catheter Associated Blood Stream Infections and [facility] [id number] REV. 7/13 Nursing and

## 2024-08-18 ENCOUNTER — APPOINTMENT (OUTPATIENT)
Dept: GENERAL RADIOLOGY | Age: 79
End: 2024-08-18
Attending: INTERNAL MEDICINE
Payer: MEDICARE

## 2024-08-18 LAB
ALBUMIN SERPL-MCNC: 2.9 G/DL (ref 3.5–5.2)
ALP SERPL-CCNC: 44 U/L (ref 35–104)
ALT SERPL-CCNC: 28 U/L (ref 5–33)
ANION GAP SERPL CALCULATED.3IONS-SCNC: 12 MMOL/L (ref 9–17)
AST SERPL-CCNC: 25 U/L
BILIRUB SERPL-MCNC: 0.2 MG/DL (ref 0.3–1.2)
BUN SERPL-MCNC: 20 MG/DL (ref 8–23)
BUN/CREAT SERPL: 40 (ref 9–20)
CA-I BLD-SCNC: 1.15 MMOL/L (ref 1.15–1.33)
CALCIUM SERPL-MCNC: 8.2 MG/DL (ref 8.6–10.4)
CHLORIDE SERPL-SCNC: 111 MMOL/L (ref 98–107)
CO2 SERPL-SCNC: 26 MMOL/L (ref 20–31)
CREAT SERPL-MCNC: 0.5 MG/DL (ref 0.5–0.9)
GFR, ESTIMATED: >90 ML/MIN/1.73M2
GLUCOSE SERPL-MCNC: 116 MG/DL (ref 70–99)
HCT VFR BLD AUTO: 30.5 % (ref 36.3–47.1)
HCT VFR BLD AUTO: 30.8 % (ref 36.3–47.1)
HCT VFR BLD AUTO: 31.8 % (ref 36.3–47.1)
HCT VFR BLD AUTO: 33.3 % (ref 36.3–47.1)
HGB BLD-MCNC: 10 G/DL (ref 11.9–15.1)
HGB BLD-MCNC: 10.4 G/DL (ref 11.9–15.1)
HGB BLD-MCNC: 9.6 G/DL (ref 11.9–15.1)
HGB BLD-MCNC: 9.7 G/DL (ref 11.9–15.1)
MAGNESIUM SERPL-MCNC: 2.1 MG/DL (ref 1.6–2.6)
PHOSPHATE SERPL-MCNC: 1.7 MG/DL (ref 2.6–4.5)
PHOSPHATE SERPL-MCNC: 2 MG/DL (ref 2.6–4.5)
PHOSPHATE SERPL-MCNC: 2.3 MG/DL (ref 2.6–4.5)
POTASSIUM SERPL-SCNC: 3.2 MMOL/L (ref 3.7–5.3)
POTASSIUM SERPL-SCNC: 3.6 MMOL/L (ref 3.7–5.3)
PROT SERPL-MCNC: 5.2 G/DL (ref 6.4–8.3)
SODIUM SERPL-SCNC: 149 MMOL/L (ref 135–144)

## 2024-08-18 PROCEDURE — 85014 HEMATOCRIT: CPT

## 2024-08-18 PROCEDURE — 80053 COMPREHEN METABOLIC PANEL: CPT

## 2024-08-18 PROCEDURE — 6360000002 HC RX W HCPCS: Performed by: INTERNAL MEDICINE

## 2024-08-18 PROCEDURE — 2700000000 HC OXYGEN THERAPY PER DAY

## 2024-08-18 PROCEDURE — 71045 X-RAY EXAM CHEST 1 VIEW: CPT

## 2024-08-18 PROCEDURE — 2580000003 HC RX 258: Performed by: INTERNAL MEDICINE

## 2024-08-18 PROCEDURE — 85018 HEMOGLOBIN: CPT

## 2024-08-18 PROCEDURE — 99232 SBSQ HOSP IP/OBS MODERATE 35: CPT | Performed by: INTERNAL MEDICINE

## 2024-08-18 PROCEDURE — 84132 ASSAY OF SERUM POTASSIUM: CPT

## 2024-08-18 PROCEDURE — 2500000003 HC RX 250 WO HCPCS: Performed by: INTERNAL MEDICINE

## 2024-08-18 PROCEDURE — 83735 ASSAY OF MAGNESIUM: CPT

## 2024-08-18 PROCEDURE — 82330 ASSAY OF CALCIUM: CPT

## 2024-08-18 PROCEDURE — 84100 ASSAY OF PHOSPHORUS: CPT

## 2024-08-18 PROCEDURE — 94761 N-INVAS EAR/PLS OXIMETRY MLT: CPT

## 2024-08-18 PROCEDURE — 2000000000 HC ICU R&B

## 2024-08-18 PROCEDURE — 2500000003 HC RX 250 WO HCPCS: Performed by: NURSE PRACTITIONER

## 2024-08-18 PROCEDURE — 6370000000 HC RX 637 (ALT 250 FOR IP): Performed by: STUDENT IN AN ORGANIZED HEALTH CARE EDUCATION/TRAINING PROGRAM

## 2024-08-18 PROCEDURE — 2580000003 HC RX 258: Performed by: NURSE PRACTITIONER

## 2024-08-18 PROCEDURE — 6370000000 HC RX 637 (ALT 250 FOR IP): Performed by: INTERNAL MEDICINE

## 2024-08-18 RX ORDER — METOPROLOL TARTRATE 25 MG/1
25 TABLET, FILM COATED ORAL 2 TIMES DAILY
Status: DISCONTINUED | OUTPATIENT
Start: 2024-08-18 | End: 2024-08-21

## 2024-08-18 RX ORDER — GAUZE BANDAGE 2" X 2"
100 BANDAGE TOPICAL DAILY
Status: DISCONTINUED | OUTPATIENT
Start: 2024-08-18 | End: 2024-08-24 | Stop reason: HOSPADM

## 2024-08-18 RX ORDER — FOLIC ACID 1 MG/1
1 TABLET ORAL DAILY
Status: DISCONTINUED | OUTPATIENT
Start: 2024-08-18 | End: 2024-08-24 | Stop reason: HOSPADM

## 2024-08-18 RX ORDER — FUROSEMIDE 10 MG/ML
20 INJECTION INTRAMUSCULAR; INTRAVENOUS ONCE
Status: COMPLETED | OUTPATIENT
Start: 2024-08-18 | End: 2024-08-18

## 2024-08-18 RX ADMIN — ONDANSETRON 4 MG: 4 TABLET, ORALLY DISINTEGRATING ORAL at 06:15

## 2024-08-18 RX ADMIN — PANTOPRAZOLE SODIUM 8 MG/HR: 40 INJECTION, POWDER, FOR SOLUTION INTRAVENOUS at 05:15

## 2024-08-18 RX ADMIN — SODIUM PHOSPHATE, MONOBASIC, MONOHYDRATE AND SODIUM PHOSPHATE, DIBASIC, ANHYDROUS 8.67 MMOL: 142; 276 INJECTION, SOLUTION INTRAVENOUS at 04:48

## 2024-08-18 RX ADMIN — FUROSEMIDE 20 MG: 10 INJECTION, SOLUTION INTRAMUSCULAR; INTRAVENOUS at 16:41

## 2024-08-18 RX ADMIN — SODIUM CHLORIDE, PRESERVATIVE FREE 10 ML: 5 INJECTION INTRAVENOUS at 19:52

## 2024-08-18 RX ADMIN — METOPROLOL TARTRATE 5 MG: 5 INJECTION INTRAVENOUS at 04:09

## 2024-08-18 RX ADMIN — ONDANSETRON 4 MG: 4 TABLET, ORALLY DISINTEGRATING ORAL at 15:12

## 2024-08-18 RX ADMIN — METOPROLOL TARTRATE 25 MG: 25 TABLET, FILM COATED ORAL at 11:52

## 2024-08-18 RX ADMIN — FOLIC ACID 1 MG: 5 INJECTION, SOLUTION INTRAMUSCULAR; INTRAVENOUS; SUBCUTANEOUS at 08:20

## 2024-08-18 RX ADMIN — SODIUM CHLORIDE: 9 INJECTION, SOLUTION INTRAVENOUS at 13:31

## 2024-08-18 RX ADMIN — THIAMINE HYDROCHLORIDE 100 MG: 100 INJECTION, SOLUTION INTRAMUSCULAR; INTRAVENOUS at 08:09

## 2024-08-18 RX ADMIN — SODIUM PHOSPHATE, MONOBASIC, MONOHYDRATE AND SODIUM PHOSPHATE, DIBASIC, ANHYDROUS 8.67 MMOL: 142; 276 INJECTION, SOLUTION INTRAVENOUS at 16:47

## 2024-08-18 RX ADMIN — MORPHINE SULFATE 2 MG: 2 INJECTION, SOLUTION INTRAMUSCULAR; INTRAVENOUS at 10:54

## 2024-08-18 RX ADMIN — METOPROLOL TARTRATE 5 MG: 5 INJECTION INTRAVENOUS at 08:10

## 2024-08-18 RX ADMIN — PANTOPRAZOLE SODIUM 8 MG/HR: 40 INJECTION, POWDER, FOR SOLUTION INTRAVENOUS at 15:25

## 2024-08-18 RX ADMIN — MORPHINE SULFATE 2 MG: 2 INJECTION, SOLUTION INTRAMUSCULAR; INTRAVENOUS at 16:41

## 2024-08-18 RX ADMIN — METOPROLOL TARTRATE 25 MG: 25 TABLET, FILM COATED ORAL at 19:52

## 2024-08-18 RX ADMIN — SODIUM CHLORIDE: 9 INJECTION, SOLUTION INTRAVENOUS at 08:18

## 2024-08-18 RX ADMIN — SODIUM CHLORIDE, PRESERVATIVE FREE 10 ML: 5 INJECTION INTRAVENOUS at 08:21

## 2024-08-18 ASSESSMENT — PAIN SCALES - GENERAL
PAINLEVEL_OUTOF10: 7
PAINLEVEL_OUTOF10: 0
PAINLEVEL_OUTOF10: 2
PAINLEVEL_OUTOF10: 3
PAINLEVEL_OUTOF10: 9

## 2024-08-18 ASSESSMENT — PAIN DESCRIPTION - ORIENTATION
ORIENTATION: MID
ORIENTATION: MID

## 2024-08-18 ASSESSMENT — PAIN DESCRIPTION - DESCRIPTORS
DESCRIPTORS: ACHING
DESCRIPTORS: ACHING

## 2024-08-18 ASSESSMENT — PAIN DESCRIPTION - LOCATION
LOCATION: ABDOMEN
LOCATION: ABDOMEN

## 2024-08-18 NOTE — PROGRESS NOTES
GASTROENTEROLOGY NOTE       Patient:   Rosa Maria Joya   :    1945   Facility:   [facility]  Date:     2024  Consultant:   Van South MD, DO      SUBJECTIVE:    78 y.o. female admitted 8/15/2024 with Upper GI bleed [K92.2].      Patient seen.  Discussed with nurse.  Some nausea but no vomiting.  No abdominal pain.  Hb stable.  Reported dark stool last night.  Ammonia normal          OBJECTIVE:   Vital Signs:  BP (!) 165/81   Pulse 94   Temp 97.5 °F (36.4 °C) (Oral)   Resp 14   Ht 1.524 m (5')   Wt 50.7 kg (111 lb 12.4 oz)   SpO2 (!) 88%   BMI 21.83 kg/m²      Physical Exam:   General appearance: Alert, NAD  Lungs: CTA bilaterally, unlabored pattern  Heart: S1S2, RRR without murmur, clicks, gallops.  Abdomen: Soft, NT, ND +BS, no masses  Skin/Musculoskeletal:  No jaundice. No clubbing, cyanosis, or edema.  ROM normal.      Lab and Imaging Review   Recent Labs     08/15/24  2144 08/15/24  2346 24  0716 24  1116 24  0235 24  0748 24  1145 24  1545 24  1925 24  0330 24  0800   WBC 16.8*  --   --   --   --   --   --   --   --   --   --    HGB 9.2*   < > 9.1*   < > 7.7* 10.4* 10.6* 9.9* 10.3* 9.7* 10.0*   MCV 89.1  --   --   --   --   --   --   --   --   --   --      --   --   --   --   --   --   --   --   --   --    INR  --   --  1.2  --   --   --   --   --   --   --   --      --  144  --  148*  --  148*  --   --  149*  --    K 3.6*  --  4.0  --  3.3*  --  3.7  --   --  3.6*  --      --  110*  --  109*  --  109*  --   --  111*  --    CO2 23  --  24  --  26  --  27  --   --  26  --    BUN 26*  --  23  --  21  --  18  --   --  20  --    CREATININE 0.8  --  0.7  --  0.6  --  0.6  --   --  0.5  --    GLUCOSE 118*  --  131*  --  133*  --  136*  --   --  116*  --    CALCIUM 7.8*  --  7.7*  --  7.4*  --  8.3*  --   --  8.2*  --    AST  --   --  53*  --  35*  --   --   --   --  25  --    ALT  --   --  39*  --  32  --

## 2024-08-18 NOTE — PROGRESS NOTES
End Of Shift Note  [facility]  Summary of shift: Patient's HR jumped into the 150's and would come back down twice. Cardiology made aware and said they will come during rounds to evaluate. Patient had one bloody, watery BM medium in size. Patient latest Hgb @330 was 9.7. Phosphorus currently being replaced at time of note. Patient still on protonix gtt. Patient BP has been elevated and was given 5mg lopressor x2. Patient out of restraints has been A/Ox3 and has not tried to pull at any lines during shift.     Vitals:    Vitals:    08/18/24 0300 08/18/24 0400 08/18/24 0500 08/18/24 0600   BP: (!) 142/58 (!) 165/81 (!) 168/69 (!) 165/81   Pulse: 87 (!) 105 (!) 106 94   Resp: (!) 9 15 15 14   Temp:  97.3 °F (36.3 °C)     TempSrc:  Temporal     SpO2: 96% 95% 94% (!) 88%   Weight:       Height:            I&O:   Intake/Output Summary (Last 24 hours) at 8/18/2024 0623  Last data filed at 8/18/2024 0557  Gross per 24 hour   Intake 3686.08 ml   Output 1825 ml   Net 1861.08 ml       Resp Status: 3L NC    Ventilator Settings:     / / /     Critical Care IV infusions:   sodium chloride      pantoprazole 8 mg/hr (08/18/24 0557)        LDA:   PICC 08/16/24 Right Brachial (Active)   Number of days: 1       Peripheral IV 08/16/24 Left (Active)   Number of days: 2       External Urinary Catheter (Active)   Number of days: 2

## 2024-08-18 NOTE — PLAN OF CARE
Problem: Discharge Planning  Goal: Discharge to home or other facility with appropriate resources  8/18/2024 1620 by Jennifer Hill RN  Outcome: Progressing  Flowsheets (Taken 8/18/2024 0800)  Discharge to home or other facility with appropriate resources: Identify barriers to discharge with patient and caregiver  8/18/2024 0623 by Ray Scott RN  Outcome: Progressing  Flowsheets (Taken 8/17/2024 2000)  Discharge to home or other facility with appropriate resources:   Identify barriers to discharge with patient and caregiver   Arrange for needed discharge resources and transportation as appropriate   Identify discharge learning needs (meds, wound care, etc)   Refer to discharge planning if patient needs post-hospital services based on physician order or complex needs related to functional status, cognitive ability or social support system     Problem: Skin/Tissue Integrity  Goal: Absence of new skin breakdown  Description: 1.  Monitor for areas of redness and/or skin breakdown  2.  Assess vascular access sites hourly  3.  Every 4-6 hours minimum:  Change oxygen saturation probe site  4.  Every 4-6 hours:  If on nasal continuous positive airway pressure, respiratory therapy assess nares and determine need for appliance change or resting period.  8/18/2024 1620 by Jennifer Hill, RN  Outcome: Progressing  8/18/2024 0623 by Ray Scott, RN  Outcome: Progressing     Problem: Safety - Adult  Goal: Free from fall injury  8/18/2024 1620 by Jennifer Hill RN  Outcome: Progressing  8/18/2024 0623 by Ray Scott RN  Outcome: Progressing     Problem: Pain  Goal: Verbalizes/displays adequate comfort level or baseline comfort level  8/18/2024 1620 by Jennifer Hill, RN  Outcome: Progressing  8/18/2024 0623 by Ray Scott RN  Outcome: Progressing     Problem: Neurosensory - Adult  Goal: Achieves stable or improved neurological status  8/18/2024 1620 by Jennifer Hill RN  Outcome: Progressing  Flowsheets (Taken 8/18/2024  Patient needs order sent for his PTINR blood work to [employer]  Thank you regarding the reason for restraint   Every 2 hours: Monitor safety, psychosocial status, comfort, nutrition and hydration

## 2024-08-18 NOTE — PROGRESS NOTES
Section of Cardiology  Progress Note      Date:  8/18/2024  Patient: Rosa Maria Joya  Admission:  8/15/2024  6:31 PM  Admit DX: Upper GI bleed [K92.2]  Age:  78 y.o., 1945     LOS: 3 days     Reason for evaluation:   Troponin elevation, HTN      SUBJECTIVE:     The patient was seen and examined. Notes and labs reviewed.  Pt laying in bed  Currently on clear liquid diet  BP and HR have been borderline and having short runs of pSVT on monitor  Denies CP, SOB, palpitations, edema  Not had PO meds yet but is cleared for them per nursing with clear liquis      OBJECTIVE:      EXAM:   Vitals:    VITALS:  BP (!) 165/81   Pulse 94   Temp 97.5 °F (36.4 °C) (Oral)   Resp 14   Ht 1.524 m (5')   Wt 50.7 kg (111 lb 12.4 oz)   SpO2 (!) 88%   BMI 21.83 kg/m²   24HR INTAKE/OUTPUT:    Intake/Output Summary (Last 24 hours) at 8/18/2024 0926  Last data filed at 8/18/2024 0557  Gross per 24 hour   Intake 2021.77 ml   Output 1650 ml   Net 371.77 ml       CONSTITUTIONAL:  awake, alert, confused, no distress  HEENT: Normal jugular venous pulsations, no carotid bruits.   LUNGS: Good respiratory effort On auscultation: clear to auscultation bilaterally  CARDIOVASCULAR:  Normal apical impulse, regular rate and rhythm, normal S1 and S2, no S3 or S4, and no murmur or rub noted.  ABDOMEN: Soft, nontender, nondistended.   SKIN: Warm and dry.  EXTREMITIES:No lower extremity edema.     Current Inpatient Medications:   nicotine  1 patch TransDERmal Daily    thiamine  100 mg IntraMUSCular Daily    folic acid 1 mg in sodium chloride 0.9 % 50 mL IVPB  1 mg IntraVENous Daily    sodium chloride flush  5-40 mL IntraVENous 2 times per day       IV Infusions (if any):   sodium chloride 20 mL/hr at 08/18/24 0818    pantoprazole 8 mg/hr (08/18/24 0557)       Diagnostics:   Telemetry: NSR/tachycardia with short runs of pSVT      Labs:   CBC:  Recent Labs     08/15/24  2144 08/15/24  2346 08/18/24  0330 08/18/24  0800   WBC 16.8*  --   --    --    HGB 9.2*   < > 9.7* 10.0*   HCT 27.1*   < > 30.8* 31.8*     --   --   --     < > = values in this interval not displayed.     Magnesium:  Recent Labs     08/17/24  1145 08/18/24  0330   MG 2.3 2.1     BMP:  Recent Labs     08/17/24  1145 08/18/24  0330   * 149*   K 3.7 3.6*   CALCIUM 8.3* 8.2*   CO2 27 26   BUN 18 20   CREATININE 0.6 0.5   LABGLOM >90 >90   GLUCOSE 136* 116*     BNP:No results for input(s): \"BNP\", \"PROBNP\" in the last 72 hours.  PT/INR:  Recent Labs     08/16/24  0716   PROTIME 15.4*   INR 1.2     APTT:  Recent Labs     08/16/24  0716   APTT 28.0     CARDIAC ENZYMES:  Recent Labs     08/15/24  2346 08/16/24  0716 08/16/24  1256   MYOGLOBIN 275* 184* 147*   TROPHS 102* 110* 118*     FASTING LIPID PANEL:No results found for: \"HDL\", \"LDLDIRECT\", \"TRIG\"  LIVER PROFILE:  Recent Labs     08/17/24  0235 08/18/24  0330   AST 35* 25   ALT 32 28   ALKPHOS 40 44   BILITOT 0.2* 0.2*   ALBUMIN 3.0* 2.9*        ASSESSMENT:  Mild troponin elevation  - 100-118 in setting of acute anemia/GI bleed  - likely demand ischemia  GI bleed/hemorrhage  - Hb 5.7. Managed by others  Hypovolemic Shock  - lactate 5.1, improving  Hypertension  Short runs of paroxysmal SVT      PLAN:  Continue current medications.  Start Metoprolol 25mg PO BID for BP and HR with plans for Toprol/coreg once tolerating more meds  Monitor BP and HR. Can use IV lopressore as needed for tachycardia or frequent pSVT  Will resume other home BP meds as stable  No other testing planned at this time  Cont medical care  Will follow. Keep on tele      Discussed with patient and nursing and family    Daryl Conley MD

## 2024-08-18 NOTE — PROGRESS NOTES
[facility]  Office: [phone]  Randall Lundberg DO, Delon Robbins DO, Donte Joseph DO, Lexa Gamez DO, Shelli Gant MD, Shruti Soliz MD, Brandon Peter MD, Mery Hutchinson MD,  Rene Alas MD, Malick Chris MD, Mahsa Barrett MD,  Salome Rodriguez DO, Felicity Rangel MD, Mynor Mcmillan MD, Rohan Lundberg DO, Nati Arnold MD,  Antwan Rowan DO, Catrina Terry MD, Reny Olmstead MD, Rupali Trent MD, Raissa Cunningham MD,  Odilon Yee MD, Nick Wade MD, Surekha Ibrahim MD, Vickey Quintero MD, Joseph Pugh MD, Randy Regalado MD, Rene Black DO, Ramiro Munson DO, Quan Ravi MD,  Esteban Aranda MD, Shirley Waterhouse, CNP,  Sallie Romero CNP, Tadeo Sherman, CNP,  Polly Ramsey, POLINA, Loli Beltran, CNP, Natividad Gill, CNP, Yessica Juárez CNP, Marguerite Morelos CNP, Jackeline Levin, PA-C, Sheila Neff PA-C, Jazmine Ashford, CNP, Nathalie Meredith, CNP, Norma Gauthier, CNP, Kristin Lawrence, CNP, Tala Hoover, CNS, Bhavani Rodriguez, TORITO, Gretta Palacios CNP, Tracy Schwab, CNP         [facility]   IN-PATIENT SERVICE   [facility]    Progress Note    8/18/2024    1:29 PM    Name:   Rosa Maria Joya  MRN:     [id number]     Acct:      [id number]   Room:   1109/1109-01   Day:  3  Admit Date:  8/15/2024  6:31 PM    PCP:   No primary care provider on file.  Code Status:  DNR-CCA    Subjective:     C/C: hemetemesis and melena  Interval History Status: .  8/18/2024  Hemoglobin has improved to 10.0  GI recommended for clear liquid diet  Potassium 3.6, phosphorus 1.7     8/17/24  Patient had EGD yesterday which showed Schatzki's ring and active UGI bleed with visible vessel and duodenum-suspected Dieulafoy lesion versus small ulcer with exposed vessel, epi was injected and hemostasis achieved.  She had hematochezia x 1 today morning  Hemoglobin stable, 10.6  She remains on Protonix drip  GI recommends to keep n.p.o. and plans second look depending on her  negative for chest pain, chest pressure/discomfort, lower extremity edema, palpitations  Gastrointestinal: + for epigastric/abdominal pain, no hematemesis overnight, still passed black stools   Neurological:  negative for dizziness, + lightheadedness on exertion, denies headache    Medications:     Allergies:    Allergies   Allergen Reactions    Keflex [Cephalexin]        Current Meds:   Scheduled Meds:    metoprolol tartrate  25 mg Oral BID    nicotine  1 patch TransDERmal Daily    thiamine  100 mg IntraMUSCular Daily    folic acid 1 mg in sodium chloride 0.9 % 50 mL IVPB  1 mg IntraVENous Daily    sodium chloride flush  5-40 mL IntraVENous 2 times per day     Continuous Infusions:    sodium chloride 20 mL/hr at 24 0818    pantoprazole 8 mg/hr (24 0557)     PRN Meds: sodium phosphate 8.67 mmol in sodium chloride 0.9 % 250 mL IVPB **OR** sodium phosphate 17.34 mmol in sodium chloride 0.9 % 250 mL IVPB, potassium chloride, magnesium sulfate, metoprolol, morphine, perflutren lipid microspheres, acetaminophen **OR** acetaminophen, sodium chloride flush, sodium chloride, ondansetron **OR** ondansetron    Data:     Past Medical History:   has no past medical history on file.    Social History:   reports that she has been smoking cigarettes. She has never used smokeless tobacco. She reports current alcohol use. She reports that she does not use drugs.     Family History: No family history on file.    Vitals:  BP (!) 163/71   Pulse 92   Temp 97.9 °F (36.6 °C) (Oral)   Resp 13   Ht 1.524 m (5')   Wt 50.7 kg (111 lb 12.4 oz)   SpO2 98%   BMI 21.83 kg/m²   Temp (24hrs), Av.9 °F (36.6 °C), Min:97.3 °F (36.3 °C), Max:98.3 °F (36.8 °C)    Recent Labs     24  1538   POCGLU 123*       I/O (24Hr):    Intake/Output Summary (Last 24 hours) at 2024 1329  Last data filed at 2024 1235  Gross per 24 hour   Intake 1371.16 ml   Output 800 ml   Net 571.16 ml       Labs:  Hematology:  Recent Labs      08/15/24  2144 08/15/24  2346 08/16/24  0716 08/16/24  1116 08/17/24  1925 08/18/24  0330 08/18/24  0800   WBC 16.8*  --   --   --   --   --   --    RBC 3.04*  --   --   --   --   --   --    HGB 9.2*   < > 9.1*   < > 10.3* 9.7* 10.0*   HCT 27.1*   < > 27.7*   < > 32.4* 30.8* 31.8*   MCV 89.1  --   --   --   --   --   --    MCH 30.3  --   --   --   --   --   --    MCHC 33.9  --   --   --   --   --   --    RDW 13.6  --   --   --   --   --   --      --   --   --   --   --   --    MPV 9.7  --   --   --   --   --   --    INR  --   --  1.2  --   --   --   --     < > = values in this interval not displayed.     Chemistry:  Recent Labs     08/15/24  2144 08/15/24  2346 08/16/24  0716 08/16/24  1256 08/17/24  0235 08/17/24  1145 08/18/24  0330   NA  --   --  144  --  148* 148* 149*   K  --   --  4.0  --  3.3* 3.7 3.6*   CL  --   --  110*  --  109* 109* 111*   CO2  --   --  24  --  26 27 26   GLUCOSE  --   --  131*  --  133* 136* 116*   BUN  --   --  23  --  21 18 20   CREATININE  --   --  0.7  --  0.6 0.6 0.5   MG  --   --   --   --  1.5* 2.3 2.1   ANIONGAP  --   --  10  --  13 12 12   LABGLOM  --   --  88  --  >90 >90 >90   CALCIUM  --   --  7.7*  --  7.4* 8.3* 8.2*   PHOS   < >  --  2.7  --  2.0* 1.9* 1.7*   TROPHS  --  102* 110* 118*  --   --   --    MYOGLOBIN  --  275* 184* 147*  --   --   --     < > = values in this interval not displayed.     Recent Labs     08/16/24  0716 08/16/24  1538 08/17/24  0235 08/17/24  1145 08/18/24  0330   AST 53*  --  35*  --  25   ALT 39*  --  32  --  28   ALKPHOS 40  --  40  --  44   BILITOT 0.2*  --  0.2*  --  0.2*   AMMONIA  --   --   --  29  --    POCGLU  --  123*  --   --   --      ABG:No results found for: \"POCPH\", \"PHART\", \"PH\", \"POCPCO2\", \"HTY2FNA\", \"PCO2\", \"POCPO2\", \"PO2ART\", \"PO2\", \"POCHCO3\", \"VAF5HCM\", \"HCO3\", \"NBEA\", \"PBEA\", \"BEART\", \"BE\", \"THGBART\", \"THB\", \"BOX2VVC\", \"SSVH0DCM\", \"V6RGJACE\", \"O2SAT\", \"FIO2\"  No results found for: \"SPECIAL\"  No results found for:

## 2024-08-18 NOTE — PLAN OF CARE
Problem: Discharge Planning  Goal: Discharge to home or other facility with appropriate resources  Outcome: Progressing  Flowsheets (Taken 8/17/2024 2000)  Discharge to home or other facility with appropriate resources:   Identify barriers to discharge with patient and caregiver   Arrange for needed discharge resources and transportation as appropriate   Identify discharge learning needs (meds, wound care, etc)   Refer to discharge planning if patient needs post-hospital services based on physician order or complex needs related to functional status, cognitive ability or social support system     Problem: Skin/Tissue Integrity  Goal: Absence of new skin breakdown  Description: 1.  Monitor for areas of redness and/or skin breakdown  2.  Assess vascular access sites hourly  3.  Every 4-6 hours minimum:  Change oxygen saturation probe site  4.  Every 4-6 hours:  If on nasal continuous positive airway pressure, respiratory therapy assess nares and determine need for appliance change or resting period.  Outcome: Progressing     Problem: Safety - Adult  Goal: Free from fall injury  Outcome: Progressing     Problem: Pain  Goal: Verbalizes/displays adequate comfort level or baseline comfort level  Outcome: Progressing     Problem: Neurosensory - Adult  Goal: Achieves stable or improved neurological status  Outcome: Progressing  Goal: Achieves maximal functionality and self care  Outcome: Progressing     Problem: Cardiovascular - Adult  Goal: Maintains optimal cardiac output and hemodynamic stability  Outcome: Progressing  Flowsheets (Taken 8/17/2024 2000)  Maintains optimal cardiac output and hemodynamic stability: Monitor blood pressure and heart rate     Problem: Skin/Tissue Integrity - Adult  Goal: Skin integrity remains intact  Outcome: Progressing  Flowsheets (Taken 8/17/2024 2000)  Skin Integrity Remains Intact:   Monitor for areas of redness and/or skin breakdown   Assess vascular access sites hourly     Problem:  electrolyte imbalances   Administer electrolyte replacement as ordered   Monitor response to electrolyte replacements, including repeat lab results as appropriate     Problem: Hematologic - Adult  Goal: Maintains hematologic stability  Outcome: Progressing  Flowsheets (Taken 8/17/2024 2000)  Maintains hematologic stability:   Assess for signs and symptoms of bleeding or hemorrhage   Monitor labs for bleeding or clotting disorders   Administer blood products/factors as ordered     Problem: Chronic Conditions and Co-morbidities  Goal: Patient's chronic conditions and co-morbidity symptoms are monitored and maintained or improved  Outcome: Progressing  Flowsheets (Taken 8/17/2024 2000)  Care Plan - Patient's Chronic Conditions and Co-Morbidity Symptoms are Monitored and Maintained or Improved:   Monitor and assess patient's chronic conditions and comorbid symptoms for stability, deterioration, or improvement   Collaborate with multidisciplinary team to address chronic and comorbid conditions and prevent exacerbation or deterioration   Update acute care plan with appropriate goals if chronic or comorbid symptoms are exacerbated and prevent overall improvement and discharge     Problem: Safety - Medical Restraint  Goal: Remains free of injury from restraints (Restraint for Interference with Medical Device)  Description: INTERVENTIONS:  1. Determine that other, less restrictive measures have been tried or would not be effective before applying the restraint  2. Evaluate the patient's condition at the time of restraint application  3. Inform patient/family regarding the reason for restraint  4. Q2H: Monitor safety, psychosocial status, comfort, nutrition and hydration  Outcome: Progressing  Flowsheets (Taken 8/17/2024 2000)  Remains free of injury from restraints (restraint for interference with medical device):   Determine that other, less restrictive measures have been tried or would not be effective before applying the

## 2024-08-18 NOTE — PROGRESS NOTES
End Of Shift Note  [facility]  Summary of shift: Patient alert and oriented, forgetful at times, cooperative, afebrile. Does c/o some stomach pain and nausea today occationally, improved with morphine, does not worsen with clear liquid diet. Hemoglobins in 10s, rechecking q8h, no s/s of bleeding, no Bms for me on my shift. L lung did sound course crackles upon auscultation, CXR done, 20 IV lasix given. Ionized calcium checked. Phosphorus replaced. Oral metoprolol restarted per cardiology, ok for pills per GI. No questions or concerns from pt at this time.    Vitals:    Vitals:    08/18/24 1641 08/18/24 1700 08/18/24 1711 08/18/24 1800   BP: (!) 147/65 (!) 150/61  (!) 118/93   Pulse:  80 87 85   Resp:  13 21 18   Temp:       TempSrc:       SpO2:  96% 95% 97%   Weight:       Height:            I&O:   Intake/Output Summary (Last 24 hours) at 8/18/2024 1814  Last data filed at 8/18/2024 1759  Gross per 24 hour   Intake 2089.71 ml   Output 1800 ml   Net 289.71 ml       Resp Status: 3L NC    Ventilator Settings:     / / /     Critical Care IV infusions:   sodium chloride 10 mL/hr at 08/18/24 1657    pantoprazole 8 mg/hr (08/18/24 1657)        LDA:   PICC 08/16/24 Right Brachial (Active)   Number of days: 1       Peripheral IV 08/16/24 Left (Active)   Number of days: 2       External Urinary Catheter (Active)   Number of days: 2

## 2024-08-19 LAB
ABO/RH: NORMAL
ANION GAP SERPL CALCULATED.3IONS-SCNC: 9 MMOL/L (ref 9–17)
ANTIBODY SCREEN: NEGATIVE
ARM BAND NUMBER: NORMAL
BLOOD BANK DISPENSE STATUS: NORMAL
BLOOD BANK DISPENSE STATUS: NORMAL
BLOOD BANK SAMPLE EXPIRATION: NORMAL
BPU ID: NORMAL
BPU ID: NORMAL
BUN SERPL-MCNC: 17 MG/DL (ref 8–23)
BUN/CREAT SERPL: 34 (ref 9–20)
CA-I BLD-SCNC: 1.18 MMOL/L (ref 1.13–1.33)
CALCIUM SERPL-MCNC: 7.9 MG/DL (ref 8.6–10.4)
CHLORIDE SERPL-SCNC: 109 MMOL/L (ref 98–107)
CO2 SERPL-SCNC: 29 MMOL/L (ref 20–31)
COMPONENT: NORMAL
COMPONENT: NORMAL
CREAT SERPL-MCNC: 0.5 MG/DL (ref 0.5–0.9)
CROSSMATCH RESULT: NORMAL
CROSSMATCH RESULT: NORMAL
GFR, ESTIMATED: >90 ML/MIN/1.73M2
GLUCOSE SERPL-MCNC: 115 MG/DL (ref 70–99)
HCT VFR BLD AUTO: 30.8 % (ref 36.3–47.1)
HCT VFR BLD AUTO: 33.3 % (ref 36.3–47.1)
HCT VFR BLD AUTO: 34.6 % (ref 36.3–47.1)
HGB BLD-MCNC: 10.4 G/DL (ref 11.9–15.1)
HGB BLD-MCNC: 10.9 G/DL (ref 11.9–15.1)
HGB BLD-MCNC: 9.7 G/DL (ref 11.9–15.1)
POTASSIUM SERPL-SCNC: 4.2 MMOL/L (ref 3.7–5.3)
SODIUM SERPL-SCNC: 147 MMOL/L (ref 135–144)
TRANSFUSION STATUS: NORMAL
TRANSFUSION STATUS: NORMAL
UNIT DIVISION: 0
UNIT DIVISION: 0

## 2024-08-19 PROCEDURE — 99232 SBSQ HOSP IP/OBS MODERATE 35: CPT | Performed by: INTERNAL MEDICINE

## 2024-08-19 PROCEDURE — 97162 PT EVAL MOD COMPLEX 30 MIN: CPT

## 2024-08-19 PROCEDURE — 6360000002 HC RX W HCPCS: Performed by: NURSE PRACTITIONER

## 2024-08-19 PROCEDURE — 6360000002 HC RX W HCPCS: Performed by: INTERNAL MEDICINE

## 2024-08-19 PROCEDURE — 2000000000 HC ICU R&B

## 2024-08-19 PROCEDURE — 85014 HEMATOCRIT: CPT

## 2024-08-19 PROCEDURE — 2580000003 HC RX 258: Performed by: INTERNAL MEDICINE

## 2024-08-19 PROCEDURE — 97530 THERAPEUTIC ACTIVITIES: CPT

## 2024-08-19 PROCEDURE — 80048 BASIC METABOLIC PNL TOTAL CA: CPT

## 2024-08-19 PROCEDURE — 6370000000 HC RX 637 (ALT 250 FOR IP): Performed by: INTERNAL MEDICINE

## 2024-08-19 PROCEDURE — 97535 SELF CARE MNGMENT TRAINING: CPT

## 2024-08-19 PROCEDURE — 97116 GAIT TRAINING THERAPY: CPT

## 2024-08-19 PROCEDURE — 85018 HEMOGLOBIN: CPT

## 2024-08-19 PROCEDURE — 97166 OT EVAL MOD COMPLEX 45 MIN: CPT

## 2024-08-19 PROCEDURE — 6370000000 HC RX 637 (ALT 250 FOR IP): Performed by: NURSE PRACTITIONER

## 2024-08-19 PROCEDURE — 6370000000 HC RX 637 (ALT 250 FOR IP): Performed by: STUDENT IN AN ORGANIZED HEALTH CARE EDUCATION/TRAINING PROGRAM

## 2024-08-19 PROCEDURE — 82330 ASSAY OF CALCIUM: CPT

## 2024-08-19 RX ORDER — LOSARTAN POTASSIUM 50 MG/1
50 TABLET ORAL DAILY
Status: DISCONTINUED | OUTPATIENT
Start: 2024-08-19 | End: 2024-08-20

## 2024-08-19 RX ADMIN — PANTOPRAZOLE SODIUM 8 MG/HR: 40 INJECTION, POWDER, FOR SOLUTION INTRAVENOUS at 19:07

## 2024-08-19 RX ADMIN — SODIUM CHLORIDE: 9 INJECTION, SOLUTION INTRAVENOUS at 00:06

## 2024-08-19 RX ADMIN — POTASSIUM CHLORIDE 10 MEQ: 7.46 INJECTION, SOLUTION INTRAVENOUS at 02:12

## 2024-08-19 RX ADMIN — LOSARTAN POTASSIUM 50 MG: 50 TABLET, FILM COATED ORAL at 15:36

## 2024-08-19 RX ADMIN — MORPHINE SULFATE 2 MG: 2 INJECTION, SOLUTION INTRAMUSCULAR; INTRAVENOUS at 20:05

## 2024-08-19 RX ADMIN — SODIUM CHLORIDE, PRESERVATIVE FREE 10 ML: 5 INJECTION INTRAVENOUS at 07:54

## 2024-08-19 RX ADMIN — SODIUM CHLORIDE, PRESERVATIVE FREE 10 ML: 5 INJECTION INTRAVENOUS at 20:07

## 2024-08-19 RX ADMIN — PANTOPRAZOLE SODIUM 8 MG/HR: 40 INJECTION, POWDER, FOR SOLUTION INTRAVENOUS at 00:03

## 2024-08-19 RX ADMIN — MORPHINE SULFATE 2 MG: 2 INJECTION, SOLUTION INTRAMUSCULAR; INTRAVENOUS at 08:10

## 2024-08-19 RX ADMIN — POTASSIUM CHLORIDE 10 MEQ: 7.46 INJECTION, SOLUTION INTRAVENOUS at 00:07

## 2024-08-19 RX ADMIN — MORPHINE SULFATE 2 MG: 2 INJECTION, SOLUTION INTRAMUSCULAR; INTRAVENOUS at 14:12

## 2024-08-19 RX ADMIN — PANTOPRAZOLE SODIUM 8 MG/HR: 40 INJECTION, POWDER, FOR SOLUTION INTRAVENOUS at 08:03

## 2024-08-19 RX ADMIN — METOPROLOL TARTRATE 25 MG: 25 TABLET, FILM COATED ORAL at 20:07

## 2024-08-19 RX ADMIN — POTASSIUM CHLORIDE 10 MEQ: 7.46 INJECTION, SOLUTION INTRAVENOUS at 01:08

## 2024-08-19 RX ADMIN — METOPROLOL TARTRATE 25 MG: 25 TABLET, FILM COATED ORAL at 07:54

## 2024-08-19 RX ADMIN — POTASSIUM CHLORIDE 10 MEQ: 7.46 INJECTION, SOLUTION INTRAVENOUS at 03:37

## 2024-08-19 RX ADMIN — FOLIC ACID 1 MG: 1 TABLET ORAL at 07:54

## 2024-08-19 RX ADMIN — Medication 100 MG: at 07:54

## 2024-08-19 RX ADMIN — ACETAMINOPHEN 650 MG: 325 TABLET ORAL at 18:06

## 2024-08-19 ASSESSMENT — PAIN SCALES - GENERAL
PAINLEVEL_OUTOF10: 2
PAINLEVEL_OUTOF10: 3
PAINLEVEL_OUTOF10: 7
PAINLEVEL_OUTOF10: 3
PAINLEVEL_OUTOF10: 2
PAINLEVEL_OUTOF10: 2
PAINLEVEL_OUTOF10: 6
PAINLEVEL_OUTOF10: 6
PAINLEVEL_OUTOF10: 8

## 2024-08-19 ASSESSMENT — PAIN DESCRIPTION - LOCATION
LOCATION: HEAD
LOCATION: BACK
LOCATION: ABDOMEN
LOCATION: ABDOMEN

## 2024-08-19 ASSESSMENT — PAIN DESCRIPTION - DESCRIPTORS
DESCRIPTORS: ACHING;OTHER (COMMENT)
DESCRIPTORS: ACHING

## 2024-08-19 ASSESSMENT — PAIN DESCRIPTION - ORIENTATION: ORIENTATION: LOWER;POSTERIOR

## 2024-08-19 NOTE — PROGRESS NOTES
[facility]  Office: [phone]  Randall Lundberg DO, Delon Robbins DO, Donte Joseph DO, Lexa Gamez DO, Shelli Gant MD, Shruti Soliz MD, Brandon Peter MD, Mery Hutchinson MD,  Rene Alas MD, Malick Chris MD, Mahsa Barrett MD,  Salome Rodriguez DO, Felicity Rangel MD, Mynor Mcmillan MD, Rohan Lundberg DO, Nati Arnold MD,  Antwan Rowan DO, Ctarina Terry MD, Reny Olmstead MD, Rupali Trent MD, Raisas Cunningham MD,  Odilon Yee MD, Nick Wade MD, Surekha Ibrahim MD, Vickey Quintero MD, Joseph Pugh MD, Randy Regalado MD, Rene Black DO, Ramiro Munson DO, Quan Ravi MD,  Esteban Aranda MD, Shirley Waterhouse, CNP,  Sallie Romero CNP, Tadeo Sherman, CNP,  Polly Ramsey, POLINA, Loli Beltran, CNP, Natividad Gill, CNP, Yessica Juárez CNP, Marguerite Morelos CNP, Jackeline Levin, PA-C, Sheila Neff PA-C, Jazmine Ashford, CNP, Nathalie Meredith, CNP, Norma Gauthier, CNP, Kristin aLwrence CNP, Tala Hoover, CNS, Bhavani Rodriguez, CNP, Gretta Palacios CNP, Tracy Schwab, CNP         [facility]   IN-PATIENT SERVICE   [facility]    Progress Note    8/19/2024    5:25 PM    Name:   Rosa Maria Reynolds  MRN:     [id number]     Acct:      [id number]   Room:   1109/1109-01   Day:  4  Admit Date:  8/15/2024  6:31 PM    PCP:   No primary care provider on file.  Code Status:  DNR-CCA    Subjective:     C/C: hemetemesis and melena  Interval History Status:   8/19/24  No new complaints  No bowel movement overnight  Hemoglobin improved to 10.9  .  GI advancing to full liquid diet    8/18/2024  Hemoglobin has improved to 10.0  GI recommended for clear liquid diet  Potassium 3.6, phosphorus 1.7     8/17/24  Patient had EGD yesterday which showed Schatzki's ring and active UGI bleed with visible vessel and duodenum-suspected Dieulafoy lesion versus small ulcer with exposed vessel, epi was injected and hemostasis achieved.  She had hematochezia x 1 today morning  Hemoglobin

## 2024-08-19 NOTE — PROGRESS NOTES
GASTROENTEROLOGY NOTE       Patient:   Rosa Maria Reynolds   :    1945   Facility:   [facility]  Date:     2024  Consultant:   WILLIAM CHRISTIAN,       SUBJECTIVE:        Patient seen and examined, hgb stable, no evidence of overt bleeding.   Has been on clears.           OBJECTIVE:   Vital Signs:  BP (!) 139/57   Pulse 76   Temp 97.8 °F (36.6 °C) (Oral)   Resp 10   Ht 1.524 m (5')   Wt 50.7 kg (111 lb 12.4 oz)   SpO2 (!) 88%   BMI 21.83 kg/m²      Physical Exam:   General appearance: Alert, NAD  Lungs: CTA bilaterally, unlabored pattern  Heart: S1S2, RRR without murmur, clicks, gallops.  Abdomen: Soft, NT, ND +BS,   Skin/Musculoskeletal:  No jaundice. No clubbing, cyanosis, or edema.  ROM normal.      Lab and Imaging Review   Recent Labs     24  0235 24  0748 24  1145 24  1545 24  1925 24  0330 24  0800 24  1521 24  2300 24  0622   HGB 7.7*   < > 10.6*   < > 10.3* 9.7* 10.0* 10.4* 9.6* 10.4*   *  --  148*  --   --  149*  --   --   --  147*   K 3.3*  --  3.7  --   --  3.6*  --   --  3.2* 4.2   *  --  109*  --   --  111*  --   --   --  109*   CO2 26  --  27  --   --  26  --   --   --  29   BUN 21  --  18  --   --  20  --   --   --  17   CREATININE 0.6  --  0.6  --   --  0.5  --   --   --  0.5   GLUCOSE 133*  --  136*  --   --  116*  --   --   --  115*   CALCIUM 7.4*  --  8.3*  --   --  8.2*  --   --   --  7.9*   AST 35*  --   --   --   --  25  --   --   --   --    ALT 32  --   --   --   --  28  --   --   --   --    ALKPHOS 40  --   --   --   --  44  --   --   --   --    BILITOT 0.2*  --   --   --   --  0.2*  --   --   --   --    AMMONIA  --   --  29  --   --   --   --   --   --   --    MG 1.5*  --  2.3  --   --  2.1  --   --   --   --     < > = values in this interval not displayed.     No results for input(s): \"INR\", \"PROTIME\" in the last 72 hours.      Impression:    Massive acute upper GI bleed last week  secondary to actively bleeding exposed vessel in the duodenum (see above).  Hemostasis achieved with EGD.  Acute blood loss anemia.  Hb stable now.  History of alcohol cirrhosis.      PLAN:    Protonix 40mg PO daily   Advance to full liquid diet.  Following.     Ursula Vallejo CNP  Discussed with Dr. South

## 2024-08-19 NOTE — PROGRESS NOTES
End Of Shift Note  [facility]  Summary of shift: Patient A/O to self and situation. Patient rested comfortably in bed through shift. Patient Hgb has been stable throughout shift. Patient had no BM during shift. Patient was given 40 mEq IV potassium during shift for K+ 3.2.     Vitals:    Vitals:    08/19/24 0300 08/19/24 0400 08/19/24 0500 08/19/24 0600   BP: (!) 137/53 (!) 146/62 (!) 143/56 (!) 156/69   Pulse: 74 71 78 80   Resp: 18 16 15 16   Temp:  97.6 °F (36.4 °C)     TempSrc:  Temporal     SpO2: 97% 98% 100% 100%   Weight:       Height:            I&O:   Intake/Output Summary (Last 24 hours) at 8/19/2024 0608  Last data filed at 8/18/2024 2135  Gross per 24 hour   Intake 1797.72 ml   Output 1450 ml   Net 347.72 ml       Resp Status: 3L NC    Ventilator Settings:     / / /     Critical Care IV infusions:   sodium chloride 10 mL/hr at 08/19/24 0006    pantoprazole 8 mg/hr (08/19/24 0003)        LDA:   PICC 08/16/24 Right Brachial (Active)   Number of days: 2       Peripheral IV 08/16/24 Left (Active)   Number of days: 3       External Urinary Catheter (Active)   Number of days: 3

## 2024-08-19 NOTE — PLAN OF CARE
Problem: Discharge Planning  Goal: Discharge to home or other facility with appropriate resources  8/19/2024 0511 by Ray Scott RN  Outcome: Progressing  8/18/2024 1620 by Jennifer Hill RN  Outcome: Progressing  Flowsheets (Taken 8/18/2024 0800)  Discharge to home or other facility with appropriate resources: Identify barriers to discharge with patient and caregiver     Problem: Skin/Tissue Integrity  Goal: Absence of new skin breakdown  Description: 1.  Monitor for areas of redness and/or skin breakdown  2.  Assess vascular access sites hourly  3.  Every 4-6 hours minimum:  Change oxygen saturation probe site  4.  Every 4-6 hours:  If on nasal continuous positive airway pressure, respiratory therapy assess nares and determine need for appliance change or resting period.  8/19/2024 0511 by Ray Scott RN  Outcome: Progressing  8/18/2024 1620 by Jennifer Hill RN  Outcome: Progressing     Problem: Safety - Adult  Goal: Free from fall injury  8/19/2024 0511 by Ray Scott RN  Outcome: Progressing  Flowsheets (Taken 8/18/2024 2000)  Free From Fall Injury: Instruct family/caregiver on patient safety  8/18/2024 1620 by Jennifer Hill RN  Outcome: Progressing     Problem: Pain  Goal: Verbalizes/displays adequate comfort level or baseline comfort level  8/19/2024 0511 by Ray Scott RN  Outcome: Progressing  8/18/2024 1620 by Jennifer Hill RN  Outcome: Progressing     Problem: Neurosensory - Adult  Goal: Achieves stable or improved neurological status  8/19/2024 0511 by Ray Scott RN  Outcome: Progressing  Flowsheets (Taken 8/18/2024 2000)  Achieves stable or improved neurological status: Assess for and report changes in neurological status  8/18/2024 1620 by Jennifer Hill RN  Outcome: Progressing  Flowsheets (Taken 8/18/2024 0800)  Achieves stable or improved neurological status: Assess for and report changes in neurological status  Goal: Achieves maximal functionality and self care  8/19/2024 0511 by Ray Scott  Progressing  Flowsheets (Taken 8/18/2024 0800)  Maintains hematologic stability: Assess for signs and symptoms of bleeding or hemorrhage     Problem: Chronic Conditions and Co-morbidities  Goal: Patient's chronic conditions and co-morbidity symptoms are monitored and maintained or improved  8/19/2024 0511 by Ray Scott RN  Outcome: Progressing  8/18/2024 1620 by Jennifer Hill RN  Outcome: Progressing  Flowsheets (Taken 8/18/2024 0800)  Care Plan - Patient's Chronic Conditions and Co-Morbidity Symptoms are Monitored and Maintained or Improved: Monitor and assess patient's chronic conditions and comorbid symptoms for stability, deterioration, or improvement     Problem: Safety - Medical Restraint  Goal: Remains free of injury from restraints (Restraint for Interference with Medical Device)  Description: INTERVENTIONS:  1. Determine that other, less restrictive measures have been tried or would not be effective before applying the restraint  2. Evaluate the patient's condition at the time of restraint application  3. Inform patient/family regarding the reason for restraint  4. Q2H: Monitor safety, psychosocial status, comfort, nutrition and hydration  8/19/2024 0511 by Ray Scott RN  Outcome: Progressing  8/18/2024 1620 by Jennifer Hill RN  Outcome: Progressing

## 2024-08-19 NOTE — PROGRESS NOTES
Occupational Therapy  Facility/Department: [facility] ICU  Occupational Therapy Initial Assessment    Name: Rosa Maria Reynolds  : 1945  MRN: [id number]  Date of Service: 2024    RN reports patient is medically stable for therapy treatment this date.    Chart reviewed prior to treatment and patient is agreeable for therapy.  All lines intact and patient positioned comfortably at end of treatment.  All patient needs addressed prior to ending therapy session.      Discharge Recommendations:  Patient would benefit from continued therapy after discharge  Pt currently functioning below baseline.  Recommend daily inpatient skilled therapy at time of discharge to maximize long term outcomes and prevent re-admission. Please refer to AM-PAC score for current level of function.  OT Equipment Recommendations  Equipment Needed: Yes  Mobility Devices: ADL Assistive Devices  ADL Assistive Devices: Emergency Alert System;Reacher;Long-handled Shoe Horn;Long-handled Sponge;Sock-Aid Hard     PER HPI: 78 y.o. Unavailable / unknown female who presents with No chief complaint on file. and is admitted to the hospital for the management of Upper GI bleed.  Patient presents as a transfer from [facility] where she initially presented with hematemesis. She was recently discharged home with coming hospital on 2024 status post mechanical fall with broken ribs.  She does have a past medical history significant for peptic ulcer disease with previous endoscopies.  Upon arrival to outside facility patient's hemoglobin returned at 5.7, WBC 15.2, INR 1.4, creatinine 0.8, sodium 134, lactic acid 5.1 with a negative ethanol level. An EKG revealed normal sinus rhythm with diffuse ST changes without acute injury. GI services were consulted at our facility and the patient was transferred here for higher level of care.  We were asked to admit the patient to medicine services.  During my initial assessment, it is noted that the patient is  assistance;Based on clinical judgement  LE Bathing: Maximum assistance;Based on clinical judgement  UE Dressing: Minimal assistance  UE Dressing Skilled Clinical Factors: To adjust gown seated EOB.  LE Dressing: Maximum assistance  LE Dressing Skilled Clinical Factors: To doff and chaparro B slippers at EOB.  Toileting: Maximum assistance  Toileting Skilled Clinical Factors: For brandon care/buttocks care, and brief management.  Functional Mobility: Moderate assistance  Functional Mobility Skilled Clinical Factors: From EOB to bathroom and back with RW and increased time needed with cues for AD safety/pacing. Mod Ax2.  Additional Comments: ADLs based on clinical observation and reasoning unless otherwise stated. Pt limited by decreased endurnace, pain, and activity tolerance.    Vision  Vision: Impaired  Vision Exceptions: Wears glasses at all times  Hearing  Hearing: Exceptions to WFL  Hearing Exceptions: Hard of hearing/hearing concerns  Cognition  Overall Cognitive Status: Exceptions  Arousal/Alertness: Appears intact  Following Commands: Appears intact  Memory: Impaired  Safety Judgement: Decreased awareness of need for assistance;Decreased awareness of need for safety  Problem Solving: Assistance required to correct errors made;Assistance required to implement solutions;Assistance required to generate solutions;Assistance required to identify errors made  Insights: Decreased awareness of deficits  Initiation: Requires cues for some  Sequencing: Requires cues for some  Cognition Comment: Eyes closed intermittently with patient c/o headache. no c/o change in vision.  RN notified.  Orientation  Overall Orientation Status: Within Normal Limits  Orientation Level: Oriented to place;Oriented to situation;Oriented to person    Education Given To: Patient;Family  Education Provided: Role of Therapy;Transfer Training;Equipment;Plan of Care;Energy Conservation;Fall Prevention Strategies;Mobility Training;ADL Adaptive

## 2024-08-19 NOTE — PROGRESS NOTES
Section of Cardiology  Progress Note      Date:  8/19/2024  Patient: Rosa Maria Joya  Admission:  8/15/2024  6:31 PM  Admit DX: Upper GI bleed [K92.2]  Age:  78 y.o., 1945     LOS: 4 days     Reason for evaluation:   Troponin elevation, HTN      SUBJECTIVE:     The patient was seen and examined. Notes and labs reviewed.  Pt laying in bed  Tired and lethargic but drinking well  Daughter at bedside  BP and HR are improving on her metoprolol  No more SVT  BP still in the 140      OBJECTIVE:      EXAM:   Vitals:    VITALS:  BP (!) 142/51   Pulse 81   Temp 98.3 °F (36.8 °C) (Oral)   Resp (!) 7   Ht 1.524 m (5')   Wt 50.7 kg (111 lb 12.4 oz)   SpO2 95%   BMI 21.83 kg/m²   24HR INTAKE/OUTPUT:    Intake/Output Summary (Last 24 hours) at 8/19/2024 0913  Last data filed at 8/19/2024 0626  Gross per 24 hour   Intake 1797.72 ml   Output 2150 ml   Net -352.28 ml       CONSTITUTIONAL:  awake, alert, confused, no distress  HEENT: Normal jugular venous pulsations, no carotid bruits.   LUNGS: Good respiratory effort On auscultation: clear to auscultation bilaterally  CARDIOVASCULAR:  Normal apical impulse, regular rate and rhythm, normal S1 and S2, no S3 or S4, and no murmur or rub noted.  ABDOMEN: Soft, nontender, nondistended.   SKIN: Warm and dry.  EXTREMITIES:No lower extremity edema.     Current Inpatient Medications:   metoprolol tartrate  25 mg Oral BID    thiamine mononitrate  100 mg Oral Daily    folic acid  1 mg Oral Daily    nicotine  1 patch TransDERmal Daily    sodium chloride flush  5-40 mL IntraVENous 2 times per day       IV Infusions (if any):   sodium chloride 10 mL/hr at 08/19/24 0006    pantoprazole 8 mg/hr (08/19/24 0803)       Diagnostics:   Telemetry: NSR/tachycardia with short runs of pSVT      Labs:   CBC:  Recent Labs     08/18/24  2300 08/19/24  0622   HGB 9.6* 10.4*   HCT 30.5* 33.3*     Magnesium:  Recent Labs     08/17/24  1145 08/18/24  0330   MG 2.3 2.1     BMP:  Recent Labs      08/18/24  0330 08/18/24  2300 08/19/24  0622   *  --  147*   K 3.6* 3.2* 4.2   CALCIUM 8.2*  --  7.9*   CO2 26  --  29   BUN 20  --  17   CREATININE 0.5  --  0.5   LABGLOM >90  --  >90   GLUCOSE 116*  --  115*     BNP:No results for input(s): \"BNP\", \"PROBNP\" in the last 72 hours.  PT/INR:  No results for input(s): \"PROTIME\", \"INR\" in the last 72 hours.    APTT:  No results for input(s): \"APTT\" in the last 72 hours.    CARDIAC ENZYMES:  Recent Labs     08/16/24  1256   MYOGLOBIN 147*   TROPHS 118*     FASTING LIPID PANEL:No results found for: \"HDL\", \"LDLDIRECT\", \"TRIG\"  LIVER PROFILE:  Recent Labs     08/17/24  0235 08/18/24  0330   AST 35* 25   ALT 32 28   ALKPHOS 40 44   BILITOT 0.2* 0.2*   ALBUMIN 3.0* 2.9*        ASSESSMENT:  Mild troponin elevation  - 100-118 in setting of acute anemia/GI bleed  - likely demand ischemia  GI bleed/hemorrhage  - Hb 5.7. Managed by others  Hypovolemic Shock  - lactate 5.1, improving  Hypertension  Short runs of paroxysmal SVT      PLAN:  Continue current medications.  Cont Metoprolol 25mg PO BID for BP and HR with plans for Toprol/coreg once tolerating more meds  Start Losartan at 50mg PO OD (home dose 100mg) and titrate  Will adjust meds as needed  No acute cardiac issues at this time and no plans for ischemic eval as IP      Discussed with patient and nursing and family    Daryl Conley MD

## 2024-08-20 ENCOUNTER — APPOINTMENT (OUTPATIENT)
Dept: GENERAL RADIOLOGY | Age: 79
End: 2024-08-20
Attending: INTERNAL MEDICINE
Payer: MEDICARE

## 2024-08-20 LAB
ANION GAP SERPL CALCULATED.3IONS-SCNC: 7 MMOL/L (ref 9–17)
BASOPHILS # BLD: 0.04 K/UL (ref 0–0.2)
BASOPHILS NFR BLD: 0 % (ref 0–2)
BUN SERPL-MCNC: 13 MG/DL (ref 8–23)
BUN/CREAT SERPL: 33 (ref 9–20)
CA-I BLD-SCNC: 1.17 MMOL/L (ref 1.15–1.33)
CALCIUM SERPL-MCNC: 8.4 MG/DL (ref 8.6–10.4)
CHLORIDE SERPL-SCNC: 106 MMOL/L (ref 98–107)
CO2 SERPL-SCNC: 31 MMOL/L (ref 20–31)
CREAT SERPL-MCNC: 0.4 MG/DL (ref 0.5–0.9)
EOSINOPHIL # BLD: 0.29 K/UL (ref 0–0.44)
EOSINOPHILS RELATIVE PERCENT: 3 % (ref 1–4)
ERYTHROCYTE [DISTWIDTH] IN BLOOD BY AUTOMATED COUNT: 15.9 % (ref 11.8–14.4)
GFR, ESTIMATED: >90 ML/MIN/1.73M2
GLUCOSE BLD-MCNC: 151 MG/DL (ref 65–105)
GLUCOSE SERPL-MCNC: 153 MG/DL (ref 70–99)
HCT VFR BLD AUTO: 33.3 % (ref 36.3–47.1)
HCT VFR BLD AUTO: 33.4 % (ref 36.3–47.1)
HCT VFR BLD AUTO: 35.2 % (ref 36.3–47.1)
HCT VFR BLD AUTO: 35.6 % (ref 36.3–47.1)
HGB BLD-MCNC: 10.3 G/DL (ref 11.9–15.1)
HGB BLD-MCNC: 10.6 G/DL (ref 11.9–15.1)
HGB BLD-MCNC: 11.2 G/DL (ref 11.9–15.1)
HGB BLD-MCNC: 11.3 G/DL (ref 11.9–15.1)
IMM GRANULOCYTES # BLD AUTO: 0.16 K/UL (ref 0–0.3)
IMM GRANULOCYTES NFR BLD: 1 %
LYMPHOCYTES NFR BLD: 1.08 K/UL (ref 1.1–3.7)
LYMPHOCYTES RELATIVE PERCENT: 10 % (ref 24–43)
MCH RBC QN AUTO: 30.5 PG (ref 25.2–33.5)
MCHC RBC AUTO-ENTMCNC: 31.7 G/DL (ref 28.4–34.8)
MCV RBC AUTO: 96 FL (ref 82.6–102.9)
MONOCYTES NFR BLD: 1.23 K/UL (ref 0.1–1.2)
MONOCYTES NFR BLD: 11 % (ref 3–12)
NEUTROPHILS NFR BLD: 75 % (ref 36–65)
NEUTS SEG NFR BLD: 8.31 K/UL (ref 1.5–8.1)
NRBC BLD-RTO: 0 PER 100 WBC
PHOSPHATE SERPL-MCNC: 1.9 MG/DL (ref 2.6–4.5)
PLATELET # BLD AUTO: 319 K/UL (ref 138–453)
PMV BLD AUTO: 8.8 FL (ref 8.1–13.5)
POTASSIUM SERPL-SCNC: 3.9 MMOL/L (ref 3.7–5.3)
RBC # BLD AUTO: 3.71 M/UL (ref 3.95–5.11)
RBC # BLD: ABNORMAL 10*6/UL
SODIUM SERPL-SCNC: 144 MMOL/L (ref 135–144)
WBC OTHER # BLD: 11.1 K/UL (ref 3.5–11.3)

## 2024-08-20 PROCEDURE — 82947 ASSAY GLUCOSE BLOOD QUANT: CPT

## 2024-08-20 PROCEDURE — 2580000003 HC RX 258: Performed by: INTERNAL MEDICINE

## 2024-08-20 PROCEDURE — 97530 THERAPEUTIC ACTIVITIES: CPT

## 2024-08-20 PROCEDURE — 94761 N-INVAS EAR/PLS OXIMETRY MLT: CPT

## 2024-08-20 PROCEDURE — 84100 ASSAY OF PHOSPHORUS: CPT

## 2024-08-20 PROCEDURE — 6370000000 HC RX 637 (ALT 250 FOR IP): Performed by: INTERNAL MEDICINE

## 2024-08-20 PROCEDURE — 2060000000 HC ICU INTERMEDIATE R&B

## 2024-08-20 PROCEDURE — 85018 HEMOGLOBIN: CPT

## 2024-08-20 PROCEDURE — 6360000002 HC RX W HCPCS: Performed by: INTERNAL MEDICINE

## 2024-08-20 PROCEDURE — 97116 GAIT TRAINING THERAPY: CPT

## 2024-08-20 PROCEDURE — 6370000000 HC RX 637 (ALT 250 FOR IP): Performed by: NURSE PRACTITIONER

## 2024-08-20 PROCEDURE — 85014 HEMATOCRIT: CPT

## 2024-08-20 PROCEDURE — 82330 ASSAY OF CALCIUM: CPT

## 2024-08-20 PROCEDURE — 2700000000 HC OXYGEN THERAPY PER DAY

## 2024-08-20 PROCEDURE — 80048 BASIC METABOLIC PNL TOTAL CA: CPT

## 2024-08-20 PROCEDURE — 99232 SBSQ HOSP IP/OBS MODERATE 35: CPT | Performed by: INTERNAL MEDICINE

## 2024-08-20 PROCEDURE — 6370000000 HC RX 637 (ALT 250 FOR IP): Performed by: STUDENT IN AN ORGANIZED HEALTH CARE EDUCATION/TRAINING PROGRAM

## 2024-08-20 PROCEDURE — 74018 RADEX ABDOMEN 1 VIEW: CPT

## 2024-08-20 PROCEDURE — 85025 COMPLETE CBC W/AUTO DIFF WBC: CPT

## 2024-08-20 RX ORDER — PANTOPRAZOLE SODIUM 40 MG/1
40 TABLET, DELAYED RELEASE ORAL
Status: DISCONTINUED | OUTPATIENT
Start: 2024-08-21 | End: 2024-08-24 | Stop reason: HOSPADM

## 2024-08-20 RX ORDER — LOSARTAN POTASSIUM 100 MG/1
100 TABLET ORAL DAILY
Status: DISCONTINUED | OUTPATIENT
Start: 2024-08-21 | End: 2024-08-24 | Stop reason: HOSPADM

## 2024-08-20 RX ORDER — ACETAMINOPHEN 325 MG/1
650 TABLET ORAL ONCE
Status: COMPLETED | OUTPATIENT
Start: 2024-08-20 | End: 2024-08-20

## 2024-08-20 RX ORDER — FENTANYL CITRATE 0.05 MG/ML
25 INJECTION, SOLUTION INTRAMUSCULAR; INTRAVENOUS
Status: DISCONTINUED | OUTPATIENT
Start: 2024-08-20 | End: 2024-08-24

## 2024-08-20 RX ADMIN — SODIUM CHLORIDE, PRESERVATIVE FREE 10 ML: 5 INJECTION INTRAVENOUS at 20:28

## 2024-08-20 RX ADMIN — PANTOPRAZOLE SODIUM 8 MG/HR: 40 INJECTION, POWDER, FOR SOLUTION INTRAVENOUS at 05:34

## 2024-08-20 RX ADMIN — FENTANYL CITRATE 25 MCG: 0.05 INJECTION, SOLUTION INTRAMUSCULAR; INTRAVENOUS at 18:28

## 2024-08-20 RX ADMIN — METOPROLOL TARTRATE 25 MG: 25 TABLET, FILM COATED ORAL at 20:28

## 2024-08-20 RX ADMIN — FENTANYL CITRATE 25 MCG: 0.05 INJECTION, SOLUTION INTRAMUSCULAR; INTRAVENOUS at 23:50

## 2024-08-20 RX ADMIN — FENTANYL CITRATE 25 MCG: 0.05 INJECTION, SOLUTION INTRAMUSCULAR; INTRAVENOUS at 09:44

## 2024-08-20 RX ADMIN — LOSARTAN POTASSIUM 50 MG: 50 TABLET, FILM COATED ORAL at 09:24

## 2024-08-20 RX ADMIN — FENTANYL CITRATE 25 MCG: 0.05 INJECTION, SOLUTION INTRAMUSCULAR; INTRAVENOUS at 22:24

## 2024-08-20 RX ADMIN — MORPHINE SULFATE 2 MG: 2 INJECTION, SOLUTION INTRAMUSCULAR; INTRAVENOUS at 02:17

## 2024-08-20 RX ADMIN — METOPROLOL TARTRATE 25 MG: 25 TABLET, FILM COATED ORAL at 09:24

## 2024-08-20 RX ADMIN — SODIUM CHLORIDE, PRESERVATIVE FREE 10 ML: 5 INJECTION INTRAVENOUS at 09:24

## 2024-08-20 RX ADMIN — ACETAMINOPHEN 650 MG: 325 TABLET ORAL at 05:32

## 2024-08-20 RX ADMIN — FENTANYL CITRATE 25 MCG: 0.05 INJECTION, SOLUTION INTRAMUSCULAR; INTRAVENOUS at 13:18

## 2024-08-20 RX ADMIN — Medication 100 MG: at 09:24

## 2024-08-20 RX ADMIN — FOLIC ACID 1 MG: 1 TABLET ORAL at 09:24

## 2024-08-20 RX ADMIN — FENTANYL CITRATE 25 MCG: 0.05 INJECTION, SOLUTION INTRAMUSCULAR; INTRAVENOUS at 20:48

## 2024-08-20 ASSESSMENT — PAIN SCALES - GENERAL
PAINLEVEL_OUTOF10: 5
PAINLEVEL_OUTOF10: 7
PAINLEVEL_OUTOF10: 5
PAINLEVEL_OUTOF10: 10
PAINLEVEL_OUTOF10: 3
PAINLEVEL_OUTOF10: 10
PAINLEVEL_OUTOF10: 9
PAINLEVEL_OUTOF10: 5
PAINLEVEL_OUTOF10: 9
PAINLEVEL_OUTOF10: 5
PAINLEVEL_OUTOF10: 5
PAINLEVEL_OUTOF10: 9
PAINLEVEL_OUTOF10: 9
PAINLEVEL_OUTOF10: 3
PAINLEVEL_OUTOF10: 5
PAINLEVEL_OUTOF10: 3
PAINLEVEL_OUTOF10: 4
PAINLEVEL_OUTOF10: 8
PAINLEVEL_OUTOF10: 3
PAINLEVEL_OUTOF10: 8

## 2024-08-20 ASSESSMENT — PAIN DESCRIPTION - LOCATION
LOCATION: ABDOMEN
LOCATION: RIB CAGE;ABDOMEN
LOCATION: BACK;RIB CAGE
LOCATION: ABDOMEN
LOCATION: BACK;RIB CAGE
LOCATION: RIB CAGE
LOCATION: RIB CAGE;ABDOMEN
LOCATION: ABDOMEN

## 2024-08-20 ASSESSMENT — PAIN DESCRIPTION - FREQUENCY
FREQUENCY: CONTINUOUS
FREQUENCY: CONTINUOUS

## 2024-08-20 ASSESSMENT — PAIN - FUNCTIONAL ASSESSMENT
PAIN_FUNCTIONAL_ASSESSMENT: ACTIVITIES ARE NOT PREVENTED
PAIN_FUNCTIONAL_ASSESSMENT: PREVENTS OR INTERFERES WITH MANY ACTIVE NOT PASSIVE ACTIVITIES
PAIN_FUNCTIONAL_ASSESSMENT: PREVENTS OR INTERFERES WITH MANY ACTIVE NOT PASSIVE ACTIVITIES
PAIN_FUNCTIONAL_ASSESSMENT: PREVENTS OR INTERFERES SOME ACTIVE ACTIVITIES AND ADLS
PAIN_FUNCTIONAL_ASSESSMENT: PREVENTS OR INTERFERES SOME ACTIVE ACTIVITIES AND ADLS
PAIN_FUNCTIONAL_ASSESSMENT: ACTIVITIES ARE NOT PREVENTED
PAIN_FUNCTIONAL_ASSESSMENT: PREVENTS OR INTERFERES SOME ACTIVE ACTIVITIES AND ADLS

## 2024-08-20 ASSESSMENT — PAIN SCALES - WONG BAKER
WONGBAKER_NUMERICALRESPONSE: HURTS A LITTLE BIT
WONGBAKER_NUMERICALRESPONSE: HURTS A LITTLE BIT

## 2024-08-20 ASSESSMENT — PAIN DESCRIPTION - DESCRIPTORS
DESCRIPTORS: ACHING;NAGGING
DESCRIPTORS: OTHER (COMMENT)
DESCRIPTORS: ACHING;DISCOMFORT
DESCRIPTORS: DISCOMFORT;ACHING
DESCRIPTORS: ACHING;SHARP;SORE
DESCRIPTORS: ACHING
DESCRIPTORS: ACHING;SORE
DESCRIPTORS: OTHER (COMMENT)
DESCRIPTORS: ACHING
DESCRIPTORS: ACHING;SHARP;SORE

## 2024-08-20 ASSESSMENT — PAIN DESCRIPTION - ORIENTATION
ORIENTATION: RIGHT;LEFT;UPPER
ORIENTATION: LEFT
ORIENTATION: LEFT;UPPER
ORIENTATION: MID
ORIENTATION: LEFT
ORIENTATION: MID
ORIENTATION: RIGHT
ORIENTATION: MID
ORIENTATION: MID
ORIENTATION: RIGHT

## 2024-08-20 ASSESSMENT — PAIN DESCRIPTION - PAIN TYPE
TYPE: ACUTE PAIN
TYPE: ACUTE PAIN

## 2024-08-20 ASSESSMENT — PAIN DESCRIPTION - ONSET
ONSET: ON-GOING
ONSET: ON-GOING

## 2024-08-20 NOTE — PLAN OF CARE
Problem: Discharge Planning  Goal: Discharge to home or other facility with appropriate resources  Outcome: Progressing  Flowsheets (Taken 8/20/2024 0800)  Discharge to home or other facility with appropriate resources:   Identify barriers to discharge with patient and caregiver   Arrange for needed discharge resources and transportation as appropriate   Identify discharge learning needs (meds, wound care, etc)   Refer to discharge planning if patient needs post-hospital services based on physician order or complex needs related to functional status, cognitive ability or social support system     Problem: Safety - Adult  Goal: Free from fall injury  8/20/2024 1043 by Mitzy Bright RN  Outcome: Progressing     Problem: Pain  Goal: Verbalizes/displays adequate comfort level or baseline comfort level  8/20/2024 1043 by Mitzy Bright RN  Outcome: Progressing  Flowsheets (Taken 8/20/2024 0800)  Verbalizes/displays adequate comfort level or baseline comfort level:   Encourage patient to monitor pain and request assistance   Assess pain using appropriate pain scale     Problem: Neurosensory - Adult  Goal: Achieves stable or improved neurological status  Outcome: Progressing  Flowsheets (Taken 8/20/2024 0800)  Achieves stable or improved neurological status: Assess for and report changes in neurological status     Problem: Neurosensory - Adult  Goal: Achieves maximal functionality and self care  Outcome: Progressing  Flowsheets (Taken 8/20/2024 0800)  Achieves maximal functionality and self care: Monitor swallowing and airway patency with patient fatigue and changes in neurological status     Problem: Cardiovascular - Adult  Goal: Maintains optimal cardiac output and hemodynamic stability  8/20/2024 1043 by Mitzy Bright RN  Outcome: Progressing  Flowsheets (Taken 8/20/2024 0800)  Maintains optimal cardiac output and hemodynamic stability: Monitor blood pressure and heart rate     Problem: Skin/Tissue  Integrity - Adult  Goal: Skin integrity remains intact  Outcome: Progressing  Flowsheets (Taken 8/20/2024 0800)  Skin Integrity Remains Intact: Monitor for areas of redness and/or skin breakdown     Problem: Musculoskeletal - Adult  Goal: Return mobility to safest level of function  8/20/2024 1043 by Mitzy Bright RN  Outcome: Progressing  Flowsheets (Taken 8/20/2024 0800)  Return Mobility to Safest Level of Function:   Assess patient stability and activity tolerance for standing, transferring and ambulating with or without assistive devices   Assist with transfers and ambulation using safe patient handling equipment as needed   Obtain physical therapy/occupational therapy consults as needed     Problem: Musculoskeletal - Adult  Goal: Return ADL status to a safe level of function  Outcome: Progressing  Flowsheets (Taken 8/20/2024 0800)  Return ADL Status to a Safe Level of Function:   Administer medication as ordered   Assess activities of daily living deficits and provide assistive devices as needed     Problem: Gastrointestinal - Adult  Goal: Minimal or absence of nausea and vomiting  8/20/2024 1043 by Mitzy Bright RN  Outcome: Progressing  Flowsheets (Taken 8/20/2024 0800)  Minimal or absence of nausea and vomiting:   Provide nonpharmacologic comfort measures as appropriate   Advance diet as tolerated, if ordered     Problem: Gastrointestinal - Adult  Goal: Maintains or returns to baseline bowel function  Outcome: Progressing  Flowsheets (Taken 8/20/2024 0800)  Maintains or returns to baseline bowel function: Assess bowel function     Problem: Metabolic/Fluid and Electrolytes - Adult  Goal: Electrolytes maintained within normal limits  Outcome: Progressing  Flowsheets (Taken 8/20/2024 0800)  Electrolytes maintained within normal limits:   Monitor labs and assess patient for signs and symptoms of electrolyte imbalances   Administer electrolyte replacement as ordered   Monitor response to electrolyte

## 2024-08-20 NOTE — PROGRESS NOTES
GASTROENTEROLOGY NOTE       Patient:   Rosa Maria Reynolds   :    1945   Facility:   [facility]  Date:     2024  Consultant:   WILLIAM CHRISTIAN      SUBJECTIVE:        Patient seen and examined, hgb stable, no evidence of overt bleeding.   Has been on full liquid diet. No evidence of bleeding.   C/o some luq pain, not new.   Daughter at bedside.  Stable hemodynamics.       OBJECTIVE:   Vital Signs:  BP (!) 155/54   Pulse 75   Temp 96.8 °F (36 °C) (Temporal)   Resp 18   Ht 1.524 m (5')   Wt 50.7 kg (111 lb 12.4 oz)   SpO2 96%   BMI 21.83 kg/m²      Physical Exam:   General appearance: Alert, NAD  Lungs: CTA bilaterally, unlabored pattern  Heart: S1S2, RRR without murmur, clicks, gallops.  Abdomen: Soft, NT, ND +BS,   Skin/Musculoskeletal:  No jaundice. No clubbing, cyanosis, or edema.  ROM normal.      Lab and Imaging Review   Recent Labs     24  1145 24  1545 24  0330 24  0800 24  2300 24  0622 24  1416 24  2200 24  0535 24  0936   WBC  --   --   --   --   --   --   --   --   --  11.1   HGB 10.6*   < > 9.7*   < > 9.6* 10.4* 10.9* 9.7* 10.3* 11.3*   MCV  --   --   --   --   --   --   --   --   --  96.0   PLT  --   --   --   --   --   --   --   --   --  319   *  --  149*  --   --  147*  --   --   --  144   K 3.7  --  3.6*  --  3.2* 4.2  --   --   --  3.9   *  --  111*  --   --  109*  --   --   --  106   CO2 27  --  26  --   --  29  --   --   --  31   BUN 18  --  20  --   --  17  --   --   --  13   CREATININE 0.6  --  0.5  --   --  0.5  --   --   --  0.4*   GLUCOSE 136*  --  116*  --   --  115*  --   --   --  153*   CALCIUM 8.3*  --  8.2*  --   --  7.9*  --   --   --  8.4*   AST  --   --  25  --   --   --   --   --   --   --    ALT  --   --  28  --   --   --   --   --   --   --    ALKPHOS  --   --  44  --   --   --   --   --   --   --    BILITOT  --   --  0.2*  --   --   --   --   --   --   --    AMMONIA 29

## 2024-08-20 NOTE — PROGRESS NOTES
End Of Shift Note  [facility] [facility]  Summary of shift: Patient had a uneventful shift. Patient was A&Ox3, disoriented to the month. Patient was medicated with PRN morphine twice thru the night for pain.  One time order to tylenol to help with pain due to the second dose of morphine not helping with the pain. Patient had 1 wet brief and 125mL out of the purwick, no bowel movements this shift.  Trending H&H remain stable, 9.7, 10.3.    Vitals:    Vitals:    08/20/24 0247 08/20/24 0300 08/20/24 0400 08/20/24 0500   BP:  (!) 131/56 (!) 119/43 (!) 151/54   Pulse:  64 67 71   Resp: 15 (!) 9 (!) 9 (!) 8   Temp:   98.3 °F (36.8 °C)    TempSrc:   Oral    SpO2:  96% 98%    Weight:       Height:            I&O:   Intake/Output Summary (Last 24 hours) at 8/20/2024 0609  Last data filed at 8/19/2024 1300  Gross per 24 hour   Intake 730 ml   Output 950 ml   Net -220 ml       Resp Status: 1L NC    Ventilator Settings:     / / /     Critical Care IV infusions:   sodium chloride 10 mL/hr at 08/19/24 0006    pantoprazole 8 mg/hr (08/20/24 0534)        LDA:   PICC 08/16/24 Right Brachial (Active)   Number of days: 3       Peripheral IV 08/16/24 Left (Active)   Number of days: 4       External Urinary Catheter (Active)   Number of days: 4

## 2024-08-20 NOTE — PROGRESS NOTES
0315: Patient medicated with PRN morphine for pain 8/10. On reassessment of pain, patient complains pain has unchanged and the morphine did not help.  RN notified JUDITH Juárez of patients pain level, and asked if patient could have anything more, per patient request.  JUDITH Juárez states \"I'll have to review\".     0427: RN messaged back to on-call NP and mentioned patient is still in pain and was wondering if the chart has been reviewed.  JUDITH Juárez responds \"Sorry, I hadn't. Pain from what?\".  RN updated on-call NP, nursing order to apply heat to affected area and tylenol. RN informed NP that the tylenol order is only for pain level of 1-3.  NP puts in one time order for 650mg tylenol

## 2024-08-20 NOTE — PROGRESS NOTES
[facility]  Office: [phone]  Randall Lundberg DO, Delon Robbins, DO, Donte Joseph DO, Lexa Gamez DO, Shelli Gant MD, Shruti Soliz MD, Brandon Peter MD, Mery Hutchinson MD,  Rene Alas MD, Malick Chris MD, Mahsa Barrett MD,  Salome Rodriguez DO, Fleicity Rangel MD, Mynor Mcmillan MD, Rohan Lundberg DO, Nati Arnold MD,  Antwan Rowan DO, Catrina Terry MD, Reny Olmstead MD, Rupali Trent MD, Raissa Cunningham MD,  Odilon Yee MD, Nick Wade MD, Surekha Ibrahim MD, Vickey Quintero MD, Joseph Pugh MD, Randy Regalado MD, Rene Black DO, Ramiro Munson DO, Quan Ravi MD,  Esteban Aranda MD, Shirley Waterhouse, CNP,  Sallie Romero CNP, Tadeo Sherman, CNP,  Polly Ramsey, POLINA, Loli Beltran, CNP, Natividad Gill, CNP, Yessica Juárez CNP, Marguerite Morelos CNP, Jackeline Levin, PA-C, Sheila Neff PA-C, Jazmine Ashford, CNP, Nathalie Meredith, CNP, Norma Gauthier, CNP, Kristin Lawrence CNP, Tala Hoover, CNS, Bhavani Rodriguez, TORITO, Gretta Palacios CNP, Tracy Schwab, CNP         [facility]   IN-PATIENT SERVICE   [facility]    Progress Note    8/20/2024    1:04 PM    Name:   Rosa Maria Reynolds  MRN:     [id number]     Acct:      [id number]   Room:   1109/1109-01   Day:  5  Admit Date:  8/15/2024  6:31 PM    PCP:   No primary care provider on file.  Code Status:  DNR-CCA    Subjective:     C/C: gi bleed  Interval History Status: improved.     Feels better today  Denies cp/sob/n/v  No further bleeding  Does have some left lower rib pain and also luq pain    Brief History:     Per my DAVION:  \"Rosa Maria Joya is a 78 y.o. Unavailable / unknown female who presents with No chief complaint on file.   and is admitted to the hospital for the management of Upper GI bleed.     Patient presents as a transfer from [facility] where she initially presented with hematemesis. She was recently discharged home with coming hospital on 8/12/2024 status post mechanical  fall with broken ribs.  She does have a past medical history significant for peptic ulcer disease with previous endoscopies.  Upon arrival to outside facility patient's hemoglobin returned at 5.7, WBC 15.2, INR 1.4, creatinine 0.8, sodium 134, lactic acid 5.1 with a negative ethanol level. An EKG revealed normal sinus rhythm with diffuse ST changes without acute injury.\"    Review of Systems:     Constitutional:  negative for chills, fevers, sweats  Respiratory:  negative for cough, dyspnea on exertion, shortness of breath, wheezing  Cardiovascular:  negative for chest pain, chest pressure/discomfort, lower extremity edema, palpitations  Gastrointestinal:  negative for constipation, diarrhea, nausea, vomiting  Neurological:  negative for dizziness, headache    Medications:     Allergies:    Allergies   Allergen Reactions   • Keflex [Cephalexin]        Current Meds:   Scheduled Meds:   • losartan  50 mg Oral Daily   • metoprolol tartrate  25 mg Oral BID   • thiamine mononitrate  100 mg Oral Daily   • folic acid  1 mg Oral Daily   • nicotine  1 patch TransDERmal Daily   • sodium chloride flush  5-40 mL IntraVENous 2 times per day     Continuous Infusions:   • sodium chloride 10 mL/hr at 08/19/24 0006   • pantoprazole 8 mg/hr (08/20/24 0534)     PRN Meds: fentanNYL, sodium phosphate 8.67 mmol in sodium chloride 0.9 % 250 mL IVPB **OR** sodium phosphate 17.34 mmol in sodium chloride 0.9 % 250 mL IVPB, potassium chloride, magnesium sulfate, metoprolol, perflutren lipid microspheres, acetaminophen **OR** acetaminophen, sodium chloride flush, sodium chloride, ondansetron **OR** ondansetron    Data:     Past Medical History:   has no past medical history on file.    Social History:   reports that she has been smoking cigarettes. She has never used smokeless tobacco. She reports current alcohol use. She reports that she does not use drugs.     Family History: No family history on file.    Vitals:  BP (!) 143/50   Pulse 64

## 2024-08-20 NOTE — PROGRESS NOTES
Patient had improved appetite for dinner, ate about 50% of regular diet dinner. About 45 minutes after eating she notified writer that she has increased abdominal pain in the RUQ and LUQ, no nausea. Abdomen is distended but soft, passing gas, bowel sounds active. Writer notified GI and attending. Orders received for stat KUB and to change diet back to clear liquids

## 2024-08-20 NOTE — PLAN OF CARE
Problem: Skin/Tissue Integrity  Goal: Absence of new skin breakdown  Description: 1.  Monitor for areas of redness and/or skin breakdown  2.  Assess vascular access sites hourly  3.  Every 4-6 hours minimum:  Change oxygen saturation probe site  4.  Every 4-6 hours:  If on nasal continuous positive airway pressure, respiratory therapy assess nares and determine need for appliance change or resting period.  Outcome: Progressing     Problem: Safety - Adult  Goal: Free from fall injury  Outcome: Progressing     Problem: Pain  Goal: Verbalizes/displays adequate comfort level or baseline comfort level  Outcome: Progressing     Problem: Cardiovascular - Adult  Goal: Maintains optimal cardiac output and hemodynamic stability  Outcome: Progressing     Problem: Musculoskeletal - Adult  Goal: Return mobility to safest level of function  Outcome: Progressing     Problem: Gastrointestinal - Adult  Goal: Minimal or absence of nausea and vomiting  Outcome: Progressing     Problem: Safety - Medical Restraint  Goal: Remains free of injury from restraints (Restraint for Interference with Medical Device)  Description: INTERVENTIONS:  1. Determine that other, less restrictive measures have been tried or would not be effective before applying the restraint  2. Evaluate the patient's condition at the time of restraint application  3. Inform patient/family regarding the reason for restraint  4. Q2H: Monitor safety, psychosocial status, comfort, nutrition and hydration  Outcome: Completed

## 2024-08-20 NOTE — PROGRESS NOTES
End Of Shift Note  [place] [facility]  Summary of shift: patient had improved shift. Becoming more alert during shift, orientation improved but does have disorientation/confusion at times but is easily reoriented. VSS. Has pain in the LUQ rib cage/abdomen/back area, pain medication changed. Diet advanced per GI, appetite improving, tolerating diet, abdomen soft/distended, bowel sounds active. Protonix drip changed to oral. Passing gas, had one dark/maroon bowel movement. HGB stable, >10. Up to chair and bathroom with walker/standby assist.  Changed to progressive status    Vitals:    Vitals:    08/20/24 1523 08/20/24 1616 08/20/24 1640 08/20/24 1700   BP:  (!) 154/58  (!) 147/53   Pulse: 77 78  78   Resp:  14  (!) 8   Temp:   96.9 °F (36.1 °C)    TempSrc:   Temporal    SpO2: 98% 98%  100%   Weight:       Height:            I&O:   Intake/Output Summary (Last 24 hours) at 8/20/2024 1733  Last data filed at 8/20/2024 1600  Gross per 24 hour   Intake 480 ml   Output 600 ml   Net -120 ml       Resp Status: 1L nasal canula, lungs clear/diminished     Ventilator Settings:     / / /     Critical Care IV infusions:   sodium chloride 10 mL/hr at 08/19/24 0006        LDA:   PICC 08/16/24 Right Brachial (Active)   Number of days: 3       Peripheral IV 08/16/24 Left (Active)   Number of days: 4       External Urinary Catheter (Active)   Number of days: 4

## 2024-08-20 NOTE — PROGRESS NOTES
Physical Therapy  Facility/Department: [facility] ICU  Daily Treatment Note  NAME: Rosa Maria Reynolds  : 1945  MRN: [id number]    Date of Service: 2024    ZEFERINO Forrester reports patient is medically stable for therapy treatment this date.    Chart reviewed prior to treatment and patient is agreeable for therapy.  All lines intact and patient positioned comfortably at end of treatment.  All patient needs addressed prior to ending therapy session.     Discharge Recommendations:  Patient would benefit from continued therapy after discharge   Pt currently functioning below baseline.  Recommend daily inpatient skilled therapy at time of discharge to maximize long term outcomes and prevent re-admission. Please refer to AM-PAC score for current level of function.        Patient Diagnosis(es): The primary encounter diagnosis was Shortness of breath. A diagnosis of Gastrointestinal hemorrhage, unspecified gastrointestinal hemorrhage type was also pertinent to this visit.    Assessment   Assessment: 79 y/o female referred to PT.  Patient required decreased assist with functional mobility compared to initial evaluation. Patient requiring min-mod A x1 with functional mobility using AD.  Endurance and L rib pain a barrier. Unsafe to attempt stairs this date. Patient is a increased fall risk; functioning below baseline and requires increased assist with functional mobility.  Activity Tolerance: Patient limited by fatigue     Plan    Physical Therapy Plan  General Plan: 5-7 times per week  Current Treatment Recommendations: Strengthening;Balance training;Functional mobility training;Transfer training;Gait training;Stair training;Return to work related activity;Home exercise program;Patient/Caregiver education & training;Safety education & training;Therapeutic activities     Restrictions  Restrictions/Precautions  Restrictions/Precautions: Fall Risk, Bed Alarm, General Precautions, Modified Diet, Up as Tolerated  Required Braces or  hand placement with carryover; TD with line management; no c/o dizziness  Gait  Gait Training: Yes  Overall Level of Assistance: Moderate assistance;Minimum assistance   Distance: 10'x2  Assistive Device: Gait belt;Walker, rolling  Interventions: Tactile cues;Safety awareness training;Verbal cues (Cues for AD placement, postioning.)      PT Exercises  Pressure Relief Exercises: Pt is at risk for skin breakdown.  Pt educated on pressure relief measures. Pt reports being comfortable at end of treatment.        Breathing Techniques: Sitting EOB patient demo good return on incentive spirometer         Goals  Short Term Goals  Time Frame for Short Term Goals: 12 visits  Short Term Goal 1: Patient will demonstrate bed mobility with SUP  Short Term Goal 2: Patient will demonstrate functional transfers using AD with CGA  Short Term Goal 3: Patient will ambulate >75' using AD with good safety awareness and good dynamic standing balance with CGA  Short Term Goal 4: Patient will ascend/descend 3 SULEMAN with HR with min A  Short Term Goal 5: Patient will verbalized and demonstrate follow through of pressure relief techniques in order to maintain good skin integrity  Additional Goals?: Yes  Short Term Goal 6: Patient will be indep with HEP for B LE following handout and report compliance with incentive spirometer  Patient Goals   Patient Goals : Return to PLOF    Education  Patient Education  Education Given To: Patient  Education Provided: Role of Therapy;Plan of Care  Verbal edu provided regarding fall prevention in the areas of community safety, transportation, proper footwear and clothing, reducing risk of falls, environmental modifications, importance of exercise, consequences of falling, plan if a fall occurs (\"rest and wait\" vs \"up and about\").    Education Method: Verbal  Education Outcome: Continued education needed    AM-PAC - Mobility  AM-PAC Basic Mobility - Inpatient   How much help is needed turning from your back to

## 2024-08-20 NOTE — PROGRESS NOTES
Section of Cardiology  Progress Note      Date:  8/20/2024  Patient: Rosa Maria Reynolds  Admission:  8/15/2024  6:31 PM  Admit DX: Upper GI bleed [K92.2]  Age:  78 y.o., 1945     LOS: 5 days     Reason for evaluation:   Troponin elevation, HTN      SUBJECTIVE:     The patient was seen and examined. Notes and labs reviewed.  Bp remains elevated but improved  Tolerating PO meds  Had been up to use the bathroom and doing well  No CP, SOB, palpitations      OBJECTIVE:      EXAM:   Vitals:    VITALS:  BP (!) 140/53   Pulse 83   Temp 96.8 °F (36 °C) (Temporal)   Resp 12   Ht 1.524 m (5')   Wt 50.7 kg (111 lb 12.4 oz)   SpO2 93%   BMI 21.83 kg/m²   24HR INTAKE/OUTPUT:    Intake/Output Summary (Last 24 hours) at 8/20/2024 0918  Last data filed at 8/19/2024 1300  Gross per 24 hour   Intake 490 ml   Output 250 ml   Net 240 ml       CONSTITUTIONAL:  awake, alert, confused, no distress  HEENT: Normal jugular venous pulsations, no carotid bruits.   LUNGS: Good respiratory effort On auscultation: clear to auscultation bilaterally  CARDIOVASCULAR:  Normal apical impulse, regular rate and rhythm, normal S1 and S2, no S3 or S4, and no murmur or rub noted.  ABDOMEN: Soft, nontender, nondistended.   SKIN: Warm and dry.  EXTREMITIES:No lower extremity edema.     Current Inpatient Medications:   losartan  50 mg Oral Daily    metoprolol tartrate  25 mg Oral BID    thiamine mononitrate  100 mg Oral Daily    folic acid  1 mg Oral Daily    nicotine  1 patch TransDERmal Daily    sodium chloride flush  5-40 mL IntraVENous 2 times per day       IV Infusions (if any):   sodium chloride 10 mL/hr at 08/19/24 0006    pantoprazole 8 mg/hr (08/20/24 0534)       Diagnostics:   Telemetry: NSR/tachycardia with short runs of pSVT      Labs:   CBC:  Recent Labs     08/19/24  2200 08/20/24  0535   HGB 9.7* 10.3*   HCT 30.8* 33.3*     Magnesium:  Recent Labs     08/17/24  1145 08/18/24  0330   MG 2.3 2.1     BMP:  Recent Labs

## 2024-08-20 NOTE — PROGRESS NOTES
complete;Head of bed raised;With handrails  Supine to Sit  Assistance Level: Contact guard assist  Skilled Clinical Factors: VCs for sequencing movements, use of bed rail and safety. Writer spent time untangling/organizing lines and managing during all movement requiring extra time for all tasks.  Scooting  Assistance Level: Stand by assist  Skilled Clinical Factors: to scoot self to EOB  Transfers  Surface: From bed;To chair with arms  Additional Factors: Set-up;Verbal cues;Hand placement cues;Increased time to complete  Device: Walker  Sit to Stand  Assistance Level: Minimal assistance  Stand to Sit  Assistance Level: Minimal assistance  Skilled Clinical Factors: MAX verbal/tactile cues for hand placement, nose over toes, upright posture, walker safety/mgmt, backing up to chair, reaching back, controlled stand to sit and overall safety. Writer managed all lines.   Neuromuscular Education  Neuromuscular education: Yes  NDT Treatment: Gait ;Sitting;Standing     Assessment  Assessment  Assessment: Pt tolerated session poorly due to fatigue, c/o dizziness with movement, high BP, decreased activity tolerance, global weakness and impaired safety awareness. Pt in bed upon arrival /54 (82) and RN stated pt was appropriate for therapy. Pt req'd CGA for bed mobility and once on EOB pt stated she felt dizzy, /67 (91) P 68 SPO2 99% on 2-3 L O2 RR14. Pt stood with MIN A with RW BP  146/96 (106) P71 SPO2 99% RR15. Pt took a few steps from bed to recliner with RW with MIN A and legs were elevated /58 (84) P70 SPO2 97% RR13. Pt appears very sleepy and c/o feeling dizzy, pt left in chair and RN notified. Skilled OT indicated to increase safety and IND with all functional tasks to ensure a safe return to PLOF.  Activity Tolerance: Patient limited by fatigue;Treatment limited secondary to medical complications  Discharge Recommendations: Patient would benefit from continued therapy after discharge  Safety

## 2024-08-21 ENCOUNTER — APPOINTMENT (OUTPATIENT)
Dept: CT IMAGING | Age: 79
End: 2024-08-21
Attending: INTERNAL MEDICINE
Payer: MEDICARE

## 2024-08-21 LAB
HCT VFR BLD AUTO: 30.5 % (ref 36.3–47.1)
HCT VFR BLD AUTO: 33.6 % (ref 36.3–47.1)
HGB BLD-MCNC: 10 G/DL (ref 11.9–15.1)
HGB BLD-MCNC: 10.8 G/DL (ref 11.9–15.1)
SURGICAL PATHOLOGY REPORT: NORMAL

## 2024-08-21 PROCEDURE — 99232 SBSQ HOSP IP/OBS MODERATE 35: CPT | Performed by: INTERNAL MEDICINE

## 2024-08-21 PROCEDURE — 2060000000 HC ICU INTERMEDIATE R&B

## 2024-08-21 PROCEDURE — 85014 HEMATOCRIT: CPT

## 2024-08-21 PROCEDURE — 6360000002 HC RX W HCPCS: Performed by: NURSE PRACTITIONER

## 2024-08-21 PROCEDURE — 6370000000 HC RX 637 (ALT 250 FOR IP): Performed by: NURSE PRACTITIONER

## 2024-08-21 PROCEDURE — 94761 N-INVAS EAR/PLS OXIMETRY MLT: CPT

## 2024-08-21 PROCEDURE — 6370000000 HC RX 637 (ALT 250 FOR IP): Performed by: STUDENT IN AN ORGANIZED HEALTH CARE EDUCATION/TRAINING PROGRAM

## 2024-08-21 PROCEDURE — 6360000002 HC RX W HCPCS: Performed by: INTERNAL MEDICINE

## 2024-08-21 PROCEDURE — 6370000000 HC RX 637 (ALT 250 FOR IP): Performed by: INTERNAL MEDICINE

## 2024-08-21 PROCEDURE — 36415 COLL VENOUS BLD VENIPUNCTURE: CPT

## 2024-08-21 PROCEDURE — 74177 CT ABD & PELVIS W/CONTRAST: CPT

## 2024-08-21 PROCEDURE — 2580000003 HC RX 258: Performed by: INTERNAL MEDICINE

## 2024-08-21 PROCEDURE — 97535 SELF CARE MNGMENT TRAINING: CPT

## 2024-08-21 PROCEDURE — 2700000000 HC OXYGEN THERAPY PER DAY

## 2024-08-21 PROCEDURE — 85018 HEMOGLOBIN: CPT

## 2024-08-21 PROCEDURE — 6360000004 HC RX CONTRAST MEDICATION: Performed by: INTERNAL MEDICINE

## 2024-08-21 RX ORDER — IOPAMIDOL 755 MG/ML
75 INJECTION, SOLUTION INTRAVASCULAR
Status: COMPLETED | OUTPATIENT
Start: 2024-08-21 | End: 2024-08-21

## 2024-08-21 RX ORDER — 0.9 % SODIUM CHLORIDE 0.9 %
80 INTRAVENOUS SOLUTION INTRAVENOUS ONCE
Status: DISCONTINUED | OUTPATIENT
Start: 2024-08-21 | End: 2024-08-24 | Stop reason: HOSPADM

## 2024-08-21 RX ORDER — CARVEDILOL 6.25 MG/1
6.25 TABLET ORAL 2 TIMES DAILY WITH MEALS
Status: DISCONTINUED | OUTPATIENT
Start: 2024-08-21 | End: 2024-08-23

## 2024-08-21 RX ORDER — IOPAMIDOL 612 MG/ML
18 INJECTION, SOLUTION INTRAVASCULAR
Status: COMPLETED | OUTPATIENT
Start: 2024-08-21 | End: 2024-08-21

## 2024-08-21 RX ORDER — SODIUM CHLORIDE 0.9 % (FLUSH) 0.9 %
10 SYRINGE (ML) INJECTION ONCE
Status: COMPLETED | OUTPATIENT
Start: 2024-08-21 | End: 2024-08-21

## 2024-08-21 RX ORDER — FENTANYL CITRATE 0.05 MG/ML
50 INJECTION, SOLUTION INTRAMUSCULAR; INTRAVENOUS ONCE
Status: COMPLETED | OUTPATIENT
Start: 2024-08-21 | End: 2024-08-21

## 2024-08-21 RX ADMIN — FENTANYL CITRATE 50 MCG: 0.05 INJECTION, SOLUTION INTRAMUSCULAR; INTRAVENOUS at 01:24

## 2024-08-21 RX ADMIN — SODIUM CHLORIDE, PRESERVATIVE FREE 10 ML: 5 INJECTION INTRAVENOUS at 17:16

## 2024-08-21 RX ADMIN — FENTANYL CITRATE 25 MCG: 0.05 INJECTION, SOLUTION INTRAMUSCULAR; INTRAVENOUS at 12:26

## 2024-08-21 RX ADMIN — CARVEDILOL 6.25 MG: 6.25 TABLET, FILM COATED ORAL at 17:36

## 2024-08-21 RX ADMIN — FOLIC ACID 1 MG: 1 TABLET ORAL at 09:08

## 2024-08-21 RX ADMIN — FENTANYL CITRATE 25 MCG: 0.05 INJECTION, SOLUTION INTRAMUSCULAR; INTRAVENOUS at 10:12

## 2024-08-21 RX ADMIN — FENTANYL CITRATE 25 MCG: 0.05 INJECTION, SOLUTION INTRAMUSCULAR; INTRAVENOUS at 18:39

## 2024-08-21 RX ADMIN — SODIUM CHLORIDE, PRESERVATIVE FREE 10 ML: 5 INJECTION INTRAVENOUS at 09:08

## 2024-08-21 RX ADMIN — LOSARTAN POTASSIUM 100 MG: 100 TABLET, FILM COATED ORAL at 09:07

## 2024-08-21 RX ADMIN — PANTOPRAZOLE SODIUM 40 MG: 40 TABLET, DELAYED RELEASE ORAL at 05:15

## 2024-08-21 RX ADMIN — ONDANSETRON 4 MG: 2 INJECTION, SOLUTION INTRAMUSCULAR; INTRAVENOUS at 07:25

## 2024-08-21 RX ADMIN — Medication 80 ML: at 17:16

## 2024-08-21 RX ADMIN — METOPROLOL TARTRATE 25 MG: 25 TABLET, FILM COATED ORAL at 09:08

## 2024-08-21 RX ADMIN — SODIUM CHLORIDE, PRESERVATIVE FREE 10 ML: 5 INJECTION INTRAVENOUS at 20:40

## 2024-08-21 RX ADMIN — IOPAMIDOL 75 ML: 755 INJECTION, SOLUTION INTRAVENOUS at 17:15

## 2024-08-21 RX ADMIN — IOPAMIDOL 18 ML: 612 INJECTION, SOLUTION INTRAVENOUS at 17:15

## 2024-08-21 RX ADMIN — Medication 100 MG: at 09:08

## 2024-08-21 RX ADMIN — FENTANYL CITRATE 25 MCG: 0.05 INJECTION, SOLUTION INTRAMUSCULAR; INTRAVENOUS at 20:39

## 2024-08-21 RX ADMIN — FENTANYL CITRATE 25 MCG: 0.05 INJECTION, SOLUTION INTRAMUSCULAR; INTRAVENOUS at 06:01

## 2024-08-21 ASSESSMENT — PAIN SCALES - WONG BAKER
WONGBAKER_NUMERICALRESPONSE: HURTS A LITTLE BIT
WONGBAKER_NUMERICALRESPONSE: NO HURT
WONGBAKER_NUMERICALRESPONSE: NO HURT
WONGBAKER_NUMERICALRESPONSE: HURTS A LITTLE BIT
WONGBAKER_NUMERICALRESPONSE: NO HURT
WONGBAKER_NUMERICALRESPONSE: HURTS A LITTLE BIT
WONGBAKER_NUMERICALRESPONSE: NO HURT
WONGBAKER_NUMERICALRESPONSE: HURTS A LITTLE BIT
WONGBAKER_NUMERICALRESPONSE: NO HURT
WONGBAKER_NUMERICALRESPONSE: NO HURT
WONGBAKER_NUMERICALRESPONSE: HURTS A LITTLE BIT

## 2024-08-21 ASSESSMENT — PAIN SCALES - GENERAL
PAINLEVEL_OUTOF10: 5
PAINLEVEL_OUTOF10: 9
PAINLEVEL_OUTOF10: 8
PAINLEVEL_OUTOF10: 5
PAINLEVEL_OUTOF10: 4
PAINLEVEL_OUTOF10: 5
PAINLEVEL_OUTOF10: 9
PAINLEVEL_OUTOF10: 9
PAINLEVEL_OUTOF10: 3
PAINLEVEL_OUTOF10: 5
PAINLEVEL_OUTOF10: 9
PAINLEVEL_OUTOF10: 6
PAINLEVEL_OUTOF10: 5
PAINLEVEL_OUTOF10: 5
PAINLEVEL_OUTOF10: 4
PAINLEVEL_OUTOF10: 8
PAINLEVEL_OUTOF10: 3
PAINLEVEL_OUTOF10: 10
PAINLEVEL_OUTOF10: 10
PAINLEVEL_OUTOF10: 5

## 2024-08-21 ASSESSMENT — PAIN DESCRIPTION - ORIENTATION
ORIENTATION: RIGHT;LEFT;UPPER
ORIENTATION: LEFT;RIGHT
ORIENTATION: MID
ORIENTATION: MID
ORIENTATION: RIGHT;LEFT;UPPER
ORIENTATION: RIGHT;LEFT
ORIENTATION: RIGHT;LEFT;UPPER
ORIENTATION: RIGHT;LEFT;UPPER
ORIENTATION: MID
ORIENTATION: MID
ORIENTATION: RIGHT;LEFT

## 2024-08-21 ASSESSMENT — PAIN DESCRIPTION - DESCRIPTORS
DESCRIPTORS: DISCOMFORT
DESCRIPTORS: ACHING;SHARP;SORE
DESCRIPTORS: ACHING;SHARP;SORE
DESCRIPTORS: ACHING
DESCRIPTORS: ACHING;SORE;SHARP
DESCRIPTORS: ACHING
DESCRIPTORS: ACHING
DESCRIPTORS: DISCOMFORT
DESCRIPTORS: ACHING

## 2024-08-21 ASSESSMENT — PAIN - FUNCTIONAL ASSESSMENT
PAIN_FUNCTIONAL_ASSESSMENT: PREVENTS OR INTERFERES WITH MANY ACTIVE NOT PASSIVE ACTIVITIES
PAIN_FUNCTIONAL_ASSESSMENT: PREVENTS OR INTERFERES SOME ACTIVE ACTIVITIES AND ADLS
PAIN_FUNCTIONAL_ASSESSMENT: PREVENTS OR INTERFERES SOME ACTIVE ACTIVITIES AND ADLS
PAIN_FUNCTIONAL_ASSESSMENT: ACTIVITIES ARE NOT PREVENTED
PAIN_FUNCTIONAL_ASSESSMENT: PREVENTS OR INTERFERES SOME ACTIVE ACTIVITIES AND ADLS
PAIN_FUNCTIONAL_ASSESSMENT: ACTIVITIES ARE NOT PREVENTED
PAIN_FUNCTIONAL_ASSESSMENT: ACTIVITIES ARE NOT PREVENTED

## 2024-08-21 ASSESSMENT — PAIN DESCRIPTION - PAIN TYPE
TYPE: ACUTE PAIN

## 2024-08-21 ASSESSMENT — PAIN DESCRIPTION - ONSET
ONSET: ON-GOING

## 2024-08-21 ASSESSMENT — PAIN DESCRIPTION - LOCATION
LOCATION: ABDOMEN;RIB CAGE
LOCATION: ABDOMEN
LOCATION: ABDOMEN;RIB CAGE
LOCATION: ABDOMEN
LOCATION: ABDOMEN;RIB CAGE
LOCATION: ABDOMEN

## 2024-08-21 ASSESSMENT — PAIN DESCRIPTION - FREQUENCY
FREQUENCY: CONTINUOUS

## 2024-08-21 NOTE — PROGRESS NOTES
Spiritual Health Assessment/Progress Note  [facility]    (P) Spiritual/Emotional Needs,  ,  ,      Name: Rosa Maria Reynolds MRN: [id number]    Age: 78 y.o.     Sex: female   Language: English   Synagogue: No Bahai on file   Upper GI bleed     Date: 8/21/2024            Total Time Calculated: (P) 11 min              Spiritual Assessment continued in [facility] ICU        Referral/Consult From: (P) Rounding   Encounter Overview/Reason: (P) Spiritual/Emotional Needs  Service Provided For: (P) Patient  Patient has strong Jainism mitra and values prayer.  Mitra, Belief, Meaning:   Patient identifies as spiritual, is connected with a mitra tradition or spiritual practice, and has beliefs or practices that help with coping during difficult times  Family/Friends No family/friends present      Importance and Influence:  Patient has spiritual/personal beliefs that influence decisions regarding their health  Family/Friends no family/friends present    Community:  Patient feels well-supported. Support system includes: Children and Other: Twin brother, Matteo.  Family/Friends Other: Unknown    Assessment and Plan of Care:     Patient Interventions include: Facilitated expression of thoughts and feelings, Explored spiritual coping/struggle/distress and theological reflection, Affirmed coping skills/support systems, and Engaged in life review and/or legacy  Family/Friends Interventions include: Other: Not present    Patient Plan of Care: Spiritual Care available upon further referral  Family/Friends Plan of Care: Spiritual Care available upon further referral    Electronically signed by Chaplain Alberto on 8/21/2024 at 11:05 AM

## 2024-08-21 NOTE — CARE COORDINATION
Social work: [facility] is [facility]. VM's left for admissions/[facility] and admissions/[facility] to see if they have reviewed referral- await c/b.   FELIPE alvarez.

## 2024-08-21 NOTE — PROGRESS NOTES
[facility]  Office: [phone]  Randall Ludnberg DO, Delon Robbins, DO, Donte Joseph DO, Lexa Gamez DO, Shelli Gant MD, Shruti Soliz MD, Brandon Peter MD, Mery Hutchinson MD,  Rene Alas MD, Malick Chris MD, Mahsa Barrett MD,  Salome Rodriguez DO, Felicity Rangel MD, Mynor Mcmillan MD, Rohan Lundberg DO, Nait Arnold MD,  Antwan Rowan DO, Catrina Terry MD, Reny Olmstead MD, Rupali Trent MD, Raissa Cunningham MD,  Odilno Yee MD, Nick Wade MD, Surekha Ibrahim MD, Vickey Quintero MD, Joseph Pugh MD, Randy Regalado MD, Rene Black DO, Ramiro Munson DO, Quan Ravi MD,  Esteban Aranda MD, Shirley Waterhouse, CNP,  Sallie Romero CNP, Tadeo Sherman, CNP,  Polly Ramsey, POLINA, Loli Beltran, CNP, Natividad Gill, CNP, Yessica Juárez CNP, Marguerite Morelos CNP, Jackeline Levin, PA-C, Sheila Neff PA-C, Jazmine Ashford, CNP, Nathalie Meredith, CNP, Norma Gauthier, CNP, Kristin Lawrence CNP, Tala Hoover, CNS, Bhavani Rodriguez, TORITO, Gretta Palacios CNP, Tracy Schwab, CNP         [facility]   IN-PATIENT SERVICE   [facility]    Progress Note    8/21/2024    1:34 PM    Name:   Rosa Maria Reynolds  MRN:     [id number]     Acct:      [id number]   Room:   1109/1109-01   Day:  6  Admit Date:  8/15/2024  6:31 PM    PCP:   No primary care provider on file.  Code Status:  DNR-CCA    Subjective:     C/C: gi bleed  Interval History Status: improved.     Feels better today  Denies cp/sob/n/v  No further bleeding  Does have some left lower rib pain  Abd pain better; had buq pain last night    Brief History:     Per my DAVION:  \"Rosa Maria Joya is a 78 y.o. Unavailable / unknown female who presents with No chief complaint on file.   and is admitted to the hospital for the management of Upper GI bleed.     Patient presents as a transfer from [facility] where she initially presented with hematemesis. She was recently discharged home with coming hospital on 8/12/2024

## 2024-08-21 NOTE — CARE COORDINATION
Social work:  Patient is now considering short-term rehab. List reviewed, choices given of 1. [facility]. [facility] 3. [facility].   Referrals sent.  HENS started.                        Post Acute Facility/Agency List     Provided patient with the following list, the list includes the overall star ratings obtained from CMS per the Medicare Web site (www.Medicare.gov):     [] Long Term Acute Care Facilities  [] Acute Inpatient Rehabilitation Facilities  [x] Skilled Nursing Facilities  [] Hospice Facilities  [] Home Care    Provided verbal instructions on how to utilize the QR Code to obtain additional detailed star ratings from www.Medicare.gov     offered to print and provide the detailed list:    []Accepted   [x]Declined

## 2024-08-21 NOTE — PLAN OF CARE
Problem: Pain  Goal: Verbalizes/displays adequate comfort level or baseline comfort level  8/20/2024 2128 by Maricruz Cortes RN  Outcome: Progressing  Flowsheets  Taken 8/20/2024 1616 by Mitzy Bright RN  Verbalizes/displays adequate comfort level or baseline comfort level:   Encourage patient to monitor pain and request assistance   Assess pain using appropriate pain scale  Taken 8/20/2024 1348 by Mitzy Bright RN  Verbalizes/displays adequate comfort level or baseline comfort level:   Encourage patient to monitor pain and request assistance   Assess pain using appropriate pain scale     Problem: Skin/Tissue Integrity - Adult  Goal: Skin integrity remains intact  8/20/2024 2128 by Maricruz Cortes RN  Outcome: Progressing  Flowsheets (Taken 8/20/2024 1045 by Mitzy Bright RN)  Skin Integrity Remains Intact: Monitor for areas of redness and/or skin breakdown     Problem: Musculoskeletal - Adult  Goal: Return ADL status to a safe level of function  8/21/2024 1031 by Mitzy Bright RN  Flowsheets  Taken 8/21/2024 1031  Return ADL Status to a Safe Level of Function:   Administer medication as ordered   Assess activities of daily living deficits and provide assistive devices as needed  Taken 8/21/2024 0800  Return ADL Status to a Safe Level of Function:   Administer medication as ordered   Assess activities of daily living deficits and provide assistive devices as needed     Problem: Gastrointestinal - Adult  Goal: Maintains or returns to baseline bowel function  8/21/2024 1031 by iMtzy Bright RN  Flowsheets  Taken 8/21/2024 1031  Maintains or returns to baseline bowel function: Assess bowel function  Taken 8/21/2024 0800  Maintains or returns to baseline bowel function: Assess bowel function     Problem: Gastrointestinal - Adult  Goal: Minimal or absence of nausea and vomiting  8/20/2024 2128 by Maricruz Cortes RN  Outcome: Progressing     Problem: Hematologic - Adult  Goal: Maintains hematologic

## 2024-08-21 NOTE — PROGRESS NOTES
Section of Cardiology  Progress Note      Date:  8/21/2024  Patient: Rosa Maria Reynolds  Admission:  8/15/2024  6:31 PM  Admit DX: Upper GI bleed [K92.2]  Age:  78 y.o., 1945     LOS: 6 days     Reason for evaluation:   Troponin elevation, HTN      SUBJECTIVE:     The patient was seen and examined. Notes and labs reviewed.  BP remains elevated despite increased losartan  Denies CP, palpitations, edema  Stable on monitor  Still has some abdominal pain      OBJECTIVE:      EXAM:   Vitals:    VITALS:  BP (!) 150/48   Pulse 88   Temp 97.8 °F (36.6 °C) (Oral)   Resp (!) 9   Ht 1.524 m (5')   Wt 51.1 kg (112 lb 9.6 oz)   SpO2 97%   BMI 21.99 kg/m²   24HR INTAKE/OUTPUT:    Intake/Output Summary (Last 24 hours) at 8/21/2024 0816  Last data filed at 8/20/2024 1800  Gross per 24 hour   Intake 720 ml   Output 500 ml   Net 220 ml       CONSTITUTIONAL:  awake, alert, confused, no distress  HEENT: Normal jugular venous pulsations, no carotid bruits.   LUNGS: Good respiratory effort On auscultation: clear to auscultation bilaterally  CARDIOVASCULAR:  Normal apical impulse, regular rate and rhythm, normal S1 and S2, no S3 or S4, and no murmur or rub noted.  ABDOMEN: Soft, nontender, nondistended.   SKIN: Warm and dry.  EXTREMITIES:No lower extremity edema.     Current Inpatient Medications:   pantoprazole  40 mg Oral QAM AC    losartan  100 mg Oral Daily    metoprolol tartrate  25 mg Oral BID    thiamine mononitrate  100 mg Oral Daily    folic acid  1 mg Oral Daily    nicotine  1 patch TransDERmal Daily    sodium chloride flush  5-40 mL IntraVENous 2 times per day       IV Infusions (if any):   sodium chloride 10 mL/hr at 08/19/24 0006       Diagnostics:   Telemetry: NSR/tachycardia with short runs of pSVT      Labs:   CBC:  Recent Labs     08/20/24  0936 08/20/24  1403 08/20/24  2135 08/21/24  0518   WBC 11.1  --   --   --    HGB 11.3*   < > 10.6* 10.8*   HCT 35.6*   < > 33.4* 33.6*     --   --   --     < >

## 2024-08-21 NOTE — PROGRESS NOTES
Occupational Therapy  Facility/Department: [facility] ICU  Rehabilitation Occupational Therapy Daily Treatment Note    Date: 24  Patient Name: Rosa Maria Reynolds       Room: 1109/1109-01  MRN: [id number]  Account: [id number]   : 1945  (78 y.o.) Gender: female                    Past Medical History:  has no past medical history on file.  Past Surgical History:   has a past surgical history that includes Upper gastrointestinal endoscopy (N/A, 2024) and Upper gastrointestinal endoscopy (N/A, 2024).    Restrictions  Restrictions/Precautions: Fall Risk, Bed Alarm, General Precautions, Up as Tolerated  Other position/activity restrictions: IV, telemetry, BP cuff, up with assist  Required Braces or Orthoses?: No    Subjective  Subjective: Pt in bathroom with PCT upon arrival, writer took over to assist pt with self care tasks. Pt very pleasant and agreeable.  Restrictions/Precautions: Fall Risk;Bed Alarm;General Precautions;Up as Tolerated             Objective     Cognition  Overall Cognitive Status: Exceptions  Arousal/Alertness: Appropriate responses to stimuli  Following Commands: Follows multistep commands consistently  Attention Span: Attends with cues to redirect  Memory: Decreased short term memory  Safety Judgement: Decreased awareness of need for assistance;Decreased awareness of need for safety  Problem Solving: Assistance required to correct errors made;Decreased awareness of errors  Insights: Decreased awareness of deficits  Initiation: Does not require cues  Sequencing: Requires cues for some  Orientation  Overall Orientation Status: Within Normal Limits   Perception  Overall Perceptual Status: WFL     ADL  Feeding  Assistance Level: Independent  Grooming/Oral Hygiene  Assistance Level: Set-up;Supervision  Skilled Clinical Factors: seated in chair for denture care  Toileting  Assistance Level: Set-up;Verbal cues;Stand by assist  Skilled Clinical Factors: pt was able to chaparro pull up over feet  Method: Verbal;Teach Back  Barriers to Learning: Cognition  Education Outcome: Verbalized understanding;Demonstrated understanding;Continued education needed    Plan  Occupational Therapy Plan  Times Per Week: 4-5x/wk, 1x/day  Current Treatment Recommendations: Strengthening;ROM;Functional mobility training;Endurance training;Safety education & training;Patient/Caregiver education & training;Equipment evaluation, education, & procurement;Positioning;Self-Care / ADL;Home management training    Goals  Patient Goals   Patient goals : No goals stated  Short Term Goals  Time Frame for Short Term Goals: By discharge  Short Term Goal 1: Pt will demo bed mobility with M/I to increase indep with ADLs.  Short Term Goal 2: Pt will demo functional mobility with AD and M/I to increase idnep with ADLs  Short Term Goal 3: Pt will demo LB dressing/bathing with AE PRN with M/I to increase indep with ADLs.  Short Term Goal 4: Pt will tolerate 15+ minutes functional activity with SUP to increase endurance and indep for ADLs.  Short Term Goal 5: Pt will demo toileting routine with M/I to increase indep with ADLs.  Long Term Goals  Long Term Goal 1: Pt will demo/verbalize education provided over BUE HEP, safety, fall prevention, AE/DME, skin integrity, d/c planning, ECT with M/I for d/c.    AM-PAC Score        AM-[facility] Inpatient Daily Activity Raw Score: 20 (08/21/24 1019)  AM-PAC Inpatient ADL T-Scale Score : 42.03 (08/21/24 1019)  ADL Inpatient CMS 0-100% Score: 38.32 (08/21/24 1019)  ADL Inpatient CMS G-Code Modifier : CJ (08/21/24 1019)      Therapy Time   Individual Concurrent Group Co-treatment   Time In 0823         Time Out 0849         Minutes 26                 JESS Sandoval

## 2024-08-21 NOTE — PROGRESS NOTES
End Of Shift Note  [place] ICU  Summary of shift: Patient A&O x4, intermittent confusion. VSS. Continues to have abdominal pain RUQ and LUQ, pain increases after meals. Abdominal distention, but remains soft, bowel sounds active. Some nausea after meals. Received prn pain medication. CT abd/pelvis ordered, results pending. H&H changed to q12h,hgb >10, No signs of bleeding. One normal, brown, bowel movement. Patient up to chair and bathroom multiple times with standby assist. Patient agreeable to snf, case management given choices.     Vitals:    Vitals:    08/21/24 1420 08/21/24 1600 08/21/24 1731 08/21/24 1802   BP: (!) 137/44 (!) 147/61 (!) 157/119 (!) 165/80   Pulse: 90 93 87 89   Resp: 11 15 21 13   Temp:  98 °F (36.7 °C)     TempSrc:  Oral     SpO2:  96% 96%    Weight:       Height:            I&O:   Intake/Output Summary (Last 24 hours) at 8/21/2024 1804  Last data filed at 8/21/2024 1733  Gross per 24 hour   Intake 720 ml   Output 1150 ml   Net -430 ml       Resp Status: nasal canula 2L, lungs clear/diminished    Ventilator Settings:     / / /     Critical Care IV infusions:   sodium chloride 10 mL/hr at 08/19/24 0006        LDA:   PICC 08/16/24 Right Brachial (Active)   Number of days: 4

## 2024-08-21 NOTE — DISCHARGE INSTR - COC
Continuity of Care Form    Patient Name: Rosa Maria Reynolds   :  1945  MRN:  [id number]    Admit date:  8/15/2024  Discharge date:  2024    Code Status Order: DNR-CCA   Advance Directives:   Advance Care Flowsheet Documentation             Admitting Physician:  Lexa Gamez DO  PCP: No primary care provider on file.    Discharging Nurse: Jenn GRANADO RN  Discharging Hospital Unit/Room#: 1109/1109-01  Discharging Unit Phone Number: [phone]    Emergency Contact:   Extended Emergency Contact Information  Primary Emergency Contact: Liliana Joya  Mobile Phone: [phone]  Relation: Daughter-in-Law  Preferred language: English   needed? No  Secondary Emergency Contact: Tyrone Joya  Home Phone: [phone]  Mobile Phone: [phone]  Relation: Child  Preferred language: English    Past Surgical History:  Past Surgical History:   Procedure Laterality Date    UPPER GASTROINTESTINAL ENDOSCOPY N/A 2024    ESOPHAGOGASTRODUODENOSCOPY EPINEPHRINE INJECTION, GOLD PROBE CAUTERY performed by Van South MD at [facility]    UPPER GASTROINTESTINAL ENDOSCOPY N/A 2024    ESOPHAGOGASTRODUODENOSCOPY CONTROL HEMORRHAGE performed by Van South MD at [facility] OR       Immunization History:   Immunization History   Administered Date(s) Administered    COVID-19, PFIZER Bivalent, DO NOT Dilute, (age 12y+), IM, 30 mcg/0.3 mL 2022    COVID-19, PFIZER PURPLE top, DILUTE for use, (age 12 y+), 30mcg/0.3mL 2021, 2021, 2021       Active Problems:  Patient Active Problem List   Diagnosis Code    Upper GI bleed K92.2    Hypertension I10    Hyperlipidemia E78.5    Gastroesophageal reflux disease with esophagitis K21.00    Diabetes mellitus (HCC) E11.9    Alcohol use Z78.9    Smoker F17.200    Excessive use of nonsteroidal anti-inflammatory drug (NSAID) F19.90    Troponin level elevated R79.89       Isolation/Infection:   Isolation            No Isolation          Patient Infection

## 2024-08-21 NOTE — PROGRESS NOTES
CT abdomen/pelvis ordered. Patient tolerated one glass of oral contrast. To CT via wheelchair. Patient tolerated CT well, no complications.

## 2024-08-21 NOTE — PROGRESS NOTES
during functional transfers/mobility using RW. Pt remains limited by decreased endurance, global weakness and impaired safety/cognition. Vitals at end of session 140/65 (86), P 96, RR 22, SPO2 on RA drops to 87-88%, 94% on 1 liter. Skilled OT indicated to increase safety and IND with all functional tasks to ensure a safe return to PLOF.  Activity Tolerance: Patient tolerated treatment well;Patient limited by endurance  Discharge Recommendations: Patient would benefit from continued therapy after discharge  Safety Devices  Safety Devices in place: Yes  Type of devices: All fall risk precautions in place;Left in chair;Nurse notified;Call light within reach;Chair alarm in place;Patient at risk for falls    Patient Education  Education  Education Given To: Patient  Education Provided: Transfer Training;Energy Conservation;Cognition;Safety;Equipment;ADL Function;Fall Prevention Strategies;Mobility Training  Education Method: Verbal;Teach Back  Barriers to Learning: Cognition;Hearing  Education Outcome: Verbalized understanding;Demonstrated understanding;Continued education needed    Plan  Occupational Therapy Plan  Times Per Week: 4-5x/wk, 1x/day  Current Treatment Recommendations: Strengthening;ROM;Functional mobility training;Endurance training;Safety education & training;Patient/Caregiver education & training;Equipment evaluation, education, & procurement;Positioning;Self-Care / ADL;Home management training    Goals  Patient Goals   Patient goals : No goals stated  Short Term Goals  Time Frame for Short Term Goals: By discharge  Short Term Goal 1: Pt will demo bed mobility with M/I to increase indep with ADLs.  Short Term Goal 2: Pt will demo functional mobility with AD and M/I to increase idnep with ADLs  Short Term Goal 3: Pt will demo LB dressing/bathing with AE PRN with M/I to increase indep with ADLs.  Short Term Goal 4: Pt will tolerate 15+ minutes functional activity with SUP to increase endurance and indep for  ADLs.  Short Term Goal 5: Pt will demo toileting routine with M/I to increase indep with ADLs.  Long Term Goals  Long Term Goal 1: Pt will demo/verbalize education provided over BUE HEP, safety, fall prevention, AE/DME, skin integrity, d/c planning, ECT with M/I for d/c.    AM-PAC Score        AM-PAC Inpatient Daily Activity Raw Score: 17 (08/21/24 1029)  AM-PAC Inpatient ADL T-Scale Score : 37.26 (08/21/24 1029)  ADL Inpatient CMS 0-100% Score: 50.11 (08/21/24 1029)  ADL Inpatient CMS G-Code Modifier : CK (08/21/24 1029)      Therapy Time   Individual Concurrent Group Co-treatment   Time In 0823         Time Out 0849         Minutes 26                 JESS Sandoval

## 2024-08-21 NOTE — PROGRESS NOTES
Patient complaining of severe abdominal pain fentanyl has been administered PRN, S. Waterhouse NP notified

## 2024-08-21 NOTE — PLAN OF CARE
Problem: Safety - Adult  Goal: Free from fall injury  8/20/2024 2128 by Maricruz Cortes RN  Outcome: Progressing     Problem: Pain  Goal: Verbalizes/displays adequate comfort level or baseline comfort level  8/20/2024 2128 by Maricruz Cortes RN  Outcome: Progressing  Flowsheets  Taken 8/20/2024 1616 by Mitzy Bright RN  Verbalizes/displays adequate comfort level or baseline comfort level:   Encourage patient to monitor pain and request assistance   Assess pain using appropriate pain scale  Taken 8/20/2024 1348 by Mitzy Bright RN  Verbalizes/displays adequate comfort level or baseline comfort level:   Encourage patient to monitor pain and request assistance   Assess pain using appropriate pain scale     Problem: Cardiovascular - Adult  Goal: Maintains optimal cardiac output and hemodynamic stability  8/20/2024 2128 by Maricruz Cortes RN  Outcome: Progressing     Problem: Genitourinary - Adult  Goal: Absence of urinary retention  8/20/2024 2128 by Maricruz Cortes RN  Outcome: Progressing     Problem: Hematologic - Adult  Goal: Maintains hematologic stability  8/20/2024 2128 by Maricruz Cortes RN  Outcome: Progressing     Problem: Chronic Conditions and Co-morbidities  Goal: Patient's chronic conditions and co-morbidity symptoms are monitored and maintained or improved  8/20/2024 2128 by Maricruz Cortes RN  Outcome: Progressing

## 2024-08-21 NOTE — PROGRESS NOTES
GASTROENTEROLOGY NOTE       Patient:   Rosa Maria Reynolds   :    1945   Facility:   [facility]  Date:     2024  Consultant:   WILLIAM CHRISTIAN      SUBJECTIVE:      Patient seen and examined. Yesterday evening I received a perfectserve that patient had eaten about 50% of her regular dinner, and about 45 minutes later she had increased ruq and luq pain, no nausea, passing flatus, had one dark/maroon BM.   I changed her back to clears at that point.     Today still some pain. I would like to check a CT scan.       OBJECTIVE:   Vital Signs:  BP (!) 150/48   Pulse 88   Temp 98 °F (36.7 °C) (Oral)   Resp 10   Ht 1.524 m (5')   Wt 51.1 kg (112 lb 9.6 oz)   SpO2 97%   BMI 21.99 kg/m²      Physical Exam:   General appearance: Alert, NAD  Lungs: CTA bilaterally, unlabored pattern  Heart: S1S2, RRR without murmur, clicks, gallops.  Abdomen: Soft, NT, ND +BS,   Skin/Musculoskeletal:  No jaundice. No clubbing, cyanosis, or edema.  ROM normal.      Lab and Imaging Review   Recent Labs     24  2300 24  0622 24  1416 24  2200 24  0535 24  0936 24  1403 24  2135 24  0518   WBC  --   --   --   --   --  11.1  --   --   --    HGB 9.6* 10.4*   < > 9.7* 10.3* 11.3* 11.2* 10.6* 10.8*   MCV  --   --   --   --   --  96.0  --   --   --    PLT  --   --   --   --   --  319  --   --   --    NA  --  147*  --   --   --  144  --   --   --    K 3.2* 4.2  --   --   --  3.9  --   --   --    CL  --  109*  --   --   --  106  --   --   --    CO2  --  29  --   --   --  31  --   --   --    BUN  --  17  --   --   --  13  --   --   --    CREATININE  --  0.5  --   --   --  0.4*  --   --   --    GLUCOSE  --  115*  --   --   --  153*  --   --   --    CALCIUM  --  7.9*  --   --   --  8.4*  --   --   --     < > = values in this interval not displayed.     No results for input(s): \"INR\", \"PROTIME\" in the last 72 hours.      Impression:    Massive acute upper GI bleed

## 2024-08-22 ENCOUNTER — APPOINTMENT (OUTPATIENT)
Dept: GENERAL RADIOLOGY | Age: 79
End: 2024-08-22
Attending: INTERNAL MEDICINE
Payer: MEDICARE

## 2024-08-22 LAB
ANION GAP SERPL CALCULATED.3IONS-SCNC: 5 MMOL/L (ref 9–17)
BUN SERPL-MCNC: 5 MG/DL (ref 8–23)
BUN/CREAT SERPL: ABNORMAL (ref 9–20)
CALCIUM SERPL-MCNC: 7.5 MG/DL (ref 8.6–10.4)
CHLORIDE SERPL-SCNC: 109 MMOL/L (ref 98–107)
CO2 SERPL-SCNC: 32 MMOL/L (ref 20–31)
CREAT SERPL-MCNC: <0.4 MG/DL (ref 0.5–0.9)
ERYTHROCYTE [DISTWIDTH] IN BLOOD BY AUTOMATED COUNT: 15.2 % (ref 11.8–14.4)
GFR, ESTIMATED: ABNORMAL ML/MIN/1.73M2
GLUCOSE SERPL-MCNC: 105 MG/DL (ref 70–99)
HCT VFR BLD AUTO: 31.1 % (ref 36.3–47.1)
HCT VFR BLD AUTO: 32.7 % (ref 36.3–47.1)
HGB BLD-MCNC: 10.5 G/DL (ref 11.9–15.1)
HGB BLD-MCNC: 9.9 G/DL (ref 11.9–15.1)
MCH RBC QN AUTO: 30.2 PG (ref 25.2–33.5)
MCHC RBC AUTO-ENTMCNC: 31.8 G/DL (ref 28.4–34.8)
MCV RBC AUTO: 94.8 FL (ref 82.6–102.9)
NRBC BLD-RTO: 0 PER 100 WBC
PLATELET # BLD AUTO: 278 K/UL (ref 138–453)
PMV BLD AUTO: 8.8 FL (ref 8.1–13.5)
POTASSIUM SERPL-SCNC: 3 MMOL/L (ref 3.7–5.3)
POTASSIUM SERPL-SCNC: 3.4 MMOL/L (ref 3.7–5.3)
RBC # BLD AUTO: 3.28 M/UL (ref 3.95–5.11)
SODIUM SERPL-SCNC: 146 MMOL/L (ref 135–144)
WBC OTHER # BLD: 10.2 K/UL (ref 3.5–11.3)

## 2024-08-22 PROCEDURE — 85014 HEMATOCRIT: CPT

## 2024-08-22 PROCEDURE — 2060000000 HC ICU INTERMEDIATE R&B

## 2024-08-22 PROCEDURE — 2580000003 HC RX 258: Performed by: INTERNAL MEDICINE

## 2024-08-22 PROCEDURE — 97116 GAIT TRAINING THERAPY: CPT

## 2024-08-22 PROCEDURE — 6360000002 HC RX W HCPCS: Performed by: INTERNAL MEDICINE

## 2024-08-22 PROCEDURE — 97110 THERAPEUTIC EXERCISES: CPT

## 2024-08-22 PROCEDURE — 84132 ASSAY OF SERUM POTASSIUM: CPT

## 2024-08-22 PROCEDURE — 94761 N-INVAS EAR/PLS OXIMETRY MLT: CPT

## 2024-08-22 PROCEDURE — 97535 SELF CARE MNGMENT TRAINING: CPT

## 2024-08-22 PROCEDURE — 6360000002 HC RX W HCPCS: Performed by: NURSE PRACTITIONER

## 2024-08-22 PROCEDURE — 2700000000 HC OXYGEN THERAPY PER DAY

## 2024-08-22 PROCEDURE — 6370000000 HC RX 637 (ALT 250 FOR IP): Performed by: NURSE PRACTITIONER

## 2024-08-22 PROCEDURE — 6370000000 HC RX 637 (ALT 250 FOR IP): Performed by: STUDENT IN AN ORGANIZED HEALTH CARE EDUCATION/TRAINING PROGRAM

## 2024-08-22 PROCEDURE — 97530 THERAPEUTIC ACTIVITIES: CPT

## 2024-08-22 PROCEDURE — 80048 BASIC METABOLIC PNL TOTAL CA: CPT

## 2024-08-22 PROCEDURE — 6370000000 HC RX 637 (ALT 250 FOR IP): Performed by: INTERNAL MEDICINE

## 2024-08-22 PROCEDURE — 85018 HEMOGLOBIN: CPT

## 2024-08-22 PROCEDURE — 99232 SBSQ HOSP IP/OBS MODERATE 35: CPT | Performed by: INTERNAL MEDICINE

## 2024-08-22 PROCEDURE — 71045 X-RAY EXAM CHEST 1 VIEW: CPT

## 2024-08-22 PROCEDURE — 85027 COMPLETE CBC AUTOMATED: CPT

## 2024-08-22 RX ORDER — LIDOCAINE HYDROCHLORIDE 20 MG/ML
15 SOLUTION OROPHARYNGEAL ONCE
Status: COMPLETED | OUTPATIENT
Start: 2024-08-22 | End: 2024-08-23

## 2024-08-22 RX ORDER — MAGNESIUM HYDROXIDE/ALUMINUM HYDROXICE/SIMETHICONE 120; 1200; 1200 MG/30ML; MG/30ML; MG/30ML
30 SUSPENSION ORAL ONCE
Status: COMPLETED | OUTPATIENT
Start: 2024-08-22 | End: 2024-08-23

## 2024-08-22 RX ADMIN — PANTOPRAZOLE SODIUM 40 MG: 40 TABLET, DELAYED RELEASE ORAL at 08:58

## 2024-08-22 RX ADMIN — FENTANYL CITRATE 25 MCG: 0.05 INJECTION, SOLUTION INTRAMUSCULAR; INTRAVENOUS at 15:28

## 2024-08-22 RX ADMIN — POTASSIUM CHLORIDE 10 MEQ: 7.46 INJECTION, SOLUTION INTRAVENOUS at 08:58

## 2024-08-22 RX ADMIN — POTASSIUM CHLORIDE 10 MEQ: 7.46 INJECTION, SOLUTION INTRAVENOUS at 10:04

## 2024-08-22 RX ADMIN — FOLIC ACID 1 MG: 1 TABLET ORAL at 08:58

## 2024-08-22 RX ADMIN — Medication 100 MG: at 08:58

## 2024-08-22 RX ADMIN — POTASSIUM CHLORIDE 10 MEQ: 7.46 INJECTION, SOLUTION INTRAVENOUS at 12:07

## 2024-08-22 RX ADMIN — LOSARTAN POTASSIUM 100 MG: 100 TABLET, FILM COATED ORAL at 08:58

## 2024-08-22 RX ADMIN — SODIUM CHLORIDE: 9 INJECTION, SOLUTION INTRAVENOUS at 08:53

## 2024-08-22 RX ADMIN — FENTANYL CITRATE 25 MCG: 0.05 INJECTION, SOLUTION INTRAMUSCULAR; INTRAVENOUS at 09:01

## 2024-08-22 RX ADMIN — SODIUM CHLORIDE, PRESERVATIVE FREE 10 ML: 5 INJECTION INTRAVENOUS at 20:18

## 2024-08-22 RX ADMIN — POTASSIUM CHLORIDE 10 MEQ: 7.46 INJECTION, SOLUTION INTRAVENOUS at 11:06

## 2024-08-22 RX ADMIN — CARVEDILOL 6.25 MG: 6.25 TABLET, FILM COATED ORAL at 17:05

## 2024-08-22 RX ADMIN — FENTANYL CITRATE 25 MCG: 0.05 INJECTION, SOLUTION INTRAMUSCULAR; INTRAVENOUS at 20:18

## 2024-08-22 RX ADMIN — CARVEDILOL 6.25 MG: 6.25 TABLET, FILM COATED ORAL at 08:58

## 2024-08-22 RX ADMIN — FENTANYL CITRATE 25 MCG: 0.05 INJECTION, SOLUTION INTRAMUSCULAR; INTRAVENOUS at 05:02

## 2024-08-22 RX ADMIN — ACETAMINOPHEN 650 MG: 325 TABLET ORAL at 11:25

## 2024-08-22 RX ADMIN — ACETAMINOPHEN 650 MG: 325 TABLET ORAL at 21:36

## 2024-08-22 ASSESSMENT — PAIN DESCRIPTION - LOCATION
LOCATION: ABDOMEN

## 2024-08-22 ASSESSMENT — PAIN SCALES - GENERAL
PAINLEVEL_OUTOF10: 5
PAINLEVEL_OUTOF10: 8
PAINLEVEL_OUTOF10: 7
PAINLEVEL_OUTOF10: 2
PAINLEVEL_OUTOF10: 9
PAINLEVEL_OUTOF10: 8
PAINLEVEL_OUTOF10: 7
PAINLEVEL_OUTOF10: 5
PAINLEVEL_OUTOF10: 3
PAINLEVEL_OUTOF10: 7
PAINLEVEL_OUTOF10: 9
PAINLEVEL_OUTOF10: 2

## 2024-08-22 ASSESSMENT — PAIN DESCRIPTION - ONSET
ONSET: ON-GOING

## 2024-08-22 ASSESSMENT — PAIN DESCRIPTION - DESCRIPTORS
DESCRIPTORS: ACHING;CRAMPING
DESCRIPTORS: STABBING
DESCRIPTORS: ACHING

## 2024-08-22 ASSESSMENT — PAIN SCALES - WONG BAKER
WONGBAKER_NUMERICALRESPONSE: NO HURT

## 2024-08-22 ASSESSMENT — PAIN - FUNCTIONAL ASSESSMENT
PAIN_FUNCTIONAL_ASSESSMENT: ACTIVITIES ARE NOT PREVENTED

## 2024-08-22 ASSESSMENT — PAIN DESCRIPTION - FREQUENCY
FREQUENCY: CONTINUOUS

## 2024-08-22 ASSESSMENT — PAIN DESCRIPTION - ORIENTATION
ORIENTATION: RIGHT;LEFT
ORIENTATION: MID
ORIENTATION: RIGHT;LEFT
ORIENTATION: UPPER
ORIENTATION: MID
ORIENTATION: MID

## 2024-08-22 ASSESSMENT — PAIN DESCRIPTION - PAIN TYPE
TYPE: ACUTE PAIN
TYPE: ACUTE PAIN

## 2024-08-22 NOTE — PROGRESS NOTES
Physical Therapy  Facility/Department: [facility] ICU  Daily Treatment Note  NAME: Rosa Maria Reynolds  : 1945  MRN: [id number]    Date of Service: 2024    Discharge Recommendations:  Patient would benefit from continued therapy after discharge    Pt currently functioning below baseline.  Recommend daily inpatient skilled therapy at time of discharge to maximize long term outcomes and prevent re-admission. Please refer to AM-PAC score for current level of function.     Patient Diagnosis(es): The primary encounter diagnosis was Shortness of breath. A diagnosis of Gastrointestinal hemorrhage, unspecified gastrointestinal hemorrhage type was also pertinent to this visit.    Assessment   Assessment: Patient demo'd progressing with improved ambulation tolerance on RA with decreased level of assistance. Limited by generalized weakness, decreased endurance, safety and balance. Patient performed Tinetti balance assessment with a score of 6; 19 or lower is a HIGH FALL RISK. Patient is below her baseline and would benefit from contiunued skilled PT services.  Activity Tolerance: Patient limited by fatigue;Patient limited by endurance     Plan    Physical Therapy Plan  General Plan: 5-7 times per week  Current Treatment Recommendations: Strengthening;Balance training;Functional mobility training;Transfer training;Gait training;Stair training;Return to work related activity;Home exercise program;Patient/Caregiver education & training;Safety education & training;Therapeutic activities     Restrictions  Restrictions/Precautions  Restrictions/Precautions: Fall Risk, Bed Alarm, General Precautions, Up as Tolerated  Required Braces or Orthoses?: No  Position Activity Restriction  Other position/activity restrictions: IV, telemetry, BP cuff, up with assist     Subjective    Subjective  Subjective: Patient agreeable for PT treatment. Patient reported, \"I really need to use the restroom.\" ZEFERINO Silverio reported patient medically stable  with CGA  Short Term Goal 3: Patient will ambulate >75' using AD with good safety awareness and good dynamic standing balance with CGA  Short Term Goal 4: Patient will ascend/descend 3 SULEMAN with HR with min A  Short Term Goal 5: Patient will verablized and demonstrate follow through of pressure relief techniques in order to maintain good skin integrity  Additional Goals?: Yes  Short Term Goal 6: Patient will be indep with HEP for B LE following handout and report compliance with incentive spirometer  Patient Goals   Patient Goals : Return to PLOF    Education  Patient Education  Education Given To: Patient;Family  Education Provided: Role of Therapy;Plan of Care;Home Exercise Program;Transfer Training;Energy Conservation;Fall Prevention Strategies  Education Provided Comments: Pt educated on purpose of acute PT treatment, importance of continued mobility throughout admission, safety awareness, safe transfers & ambulation w/ RW, circulation ex's, seated HEP, pressure relief, breathing techniques, prevention of sedentary/secondary complications, and incentive spirometer usage\" ,and \"PT POC. Pt demonstrated FAIR carryover  Pt requires continued reinforcement of education.   Education Method: Verbal  Education Outcome: Verbalized understanding;Continued education needed    AM-PAC - Mobility    AM-PAC Basic Mobility - Inpatient   How much help is needed turning from your back to your side while in a flat bed without using bedrails?: A Little  How much help is needed moving from lying on your back to sitting on the side of a flat bed without using bedrails?: A Little  How much help is needed moving to and from a bed to a chair?: A Little  How much help is needed standing up from a chair using your arms?: A Little  How much help is needed walking in hospital room?: A Lot  How much help is needed climbing 3-5 steps with a railing?: Total  AM-PAC Inpatient Mobility Raw Score : 15  AM-PAC Inpatient T-Scale Score :  39.45  Mobility Inpatient CMS 0-100% Score: 57.7  Mobility Inpatient CMS G-Code Modifier : CK    Tinetti Balance assessment: 6 (19 or lower is a HIGH FALL RISK)          Therapy Time   Individual Concurrent Group Co-treatment   Time In 1405         Time Out 1444         Minutes 39                 Sara Larson, PTA

## 2024-08-22 NOTE — PROGRESS NOTES
GASTROENTEROLOGY NOTE       Patient:   Rosa Maria Reynolds   :    1945   Facility:   [facility]  Date:     2024  Consultant:   WILLIAM CHRISTIAN      SUBJECTIVE:      Patient seen and examined, family at bedside.   Clinically about the same.   CT reviewed.   No overt blood loss.   Still pain LUQ, eating clears.     CT a/p yesterday.   IMPRESSION:  1. Questionable mild circumferential rectal wall thickening with perirectal  stranding. This could represent proctitis.  2. Moderate to large bilateral pleural effusions with bibasilar  consolidations which could represent atelectasis or pneumonia.  3. Small volume of ascites and extensive subcutaneous edema suggesting  anasarca.  4. Acute mildly displaced left lateral 6th and 7th rib fractures.  5. Mild to moderate age-indeterminate possibly acute T11 compression  fracture. No retropulsion.  If clinically indicated further evaluation could  be obtained with MRI.  6. 3.4 cm right adrenal nodule.  See recommendations below.  7. Cholelithiasis without evidence of acute cholecystitis.     RECOMMENDATIONS:  Right adrenal mass measuring 3.4 cm, probable benign adenoma. Recommend  adrenal washout CT or chemical shift MRI.     JACR 2017 Aug;   OBJECTIVE:   Vital Signs:  BP (!) 107/53   Pulse 76   Temp 98.3 °F (36.8 °C) (Oral)   Resp (!) 7   Ht 1.524 m (5')   Wt 51.1 kg (112 lb 9.6 oz)   SpO2 100%   BMI 21.99 kg/m²      Physical Exam:   General appearance: Alert, NAD  Lungs: diminished bases on supplemental O2  Heart: S1S2, RRR without murmur, clicks, gallops.  Abdomen: Soft, anasarca ttp luq  Skin/Musculoskeletal:  No jaundice. No clubbing, cyanosis, or edema.  ROM normal.      Lab and Imaging Review   Recent Labs     24  0936 24  1403 24  2135 24  0518 24  1730 24  0509 24  0633   WBC 11.1  --   --   --   --  10.2  --    HGB 11.3* 11.2* 10.6* 10.8* 10.0* 9.9*  --    MCV 96.0  --   --   --   --  94.8

## 2024-08-22 NOTE — PROGRESS NOTES
Section of Cardiology  Progress Note      Date:  8/22/2024  Patient: Rosa Maria Reynolds  Admission:  8/15/2024  6:31 PM  Admit DX: Upper GI bleed [K92.2]  Age:  78 y.o., 1945     LOS: 7 days     Reason for evaluation:   Troponin elevation, HTN      SUBJECTIVE:     The patient was seen and examined. Notes and labs reviewed.  Bp improved on coreg  Pt still has abdominal pain  Denies CP, SOB, edema  No other issues        OBJECTIVE:      EXAM:   Vitals:    VITALS:  BP (!) 124/92   Pulse 77   Temp 98 °F (36.7 °C) (Oral)   Resp (!) 8   Ht 1.524 m (5')   Wt 51.1 kg (112 lb 9.6 oz)   SpO2 96%   BMI 21.99 kg/m²   24HR INTAKE/OUTPUT:    Intake/Output Summary (Last 24 hours) at 8/22/2024 0847  Last data filed at 8/21/2024 1733  Gross per 24 hour   Intake 720 ml   Output 1150 ml   Net -430 ml       CONSTITUTIONAL:  awake, alert, confused, no distress  HEENT: Normal jugular venous pulsations, no carotid bruits.   LUNGS: Good respiratory effort On auscultation: clear to auscultation bilaterally  CARDIOVASCULAR:  Normal apical impulse, regular rate and rhythm, normal S1 and S2, no S3 or S4, and no murmur or rub noted.  ABDOMEN: Soft, nontender, nondistended.   SKIN: Warm and dry.  EXTREMITIES:No lower extremity edema.     Current Inpatient Medications:   carvedilol  6.25 mg Oral BID WC    sodium chloride  80 mL IntraVENous Once    pantoprazole  40 mg Oral QAM AC    losartan  100 mg Oral Daily    thiamine mononitrate  100 mg Oral Daily    folic acid  1 mg Oral Daily    nicotine  1 patch TransDERmal Daily    sodium chloride flush  5-40 mL IntraVENous 2 times per day       IV Infusions (if any):   sodium chloride 10 mL/hr at 08/19/24 0006       Diagnostics:   Telemetry: NSR/tachycardia with short runs of pSVT      Labs:   CBC:  Recent Labs     08/20/24  0936 08/20/24  1403 08/21/24  1730 08/22/24  0509   WBC 11.1  --   --  10.2   HGB 11.3*   < > 10.0* 9.9*   HCT 35.6*   < > 30.5* 31.1*     --   --  278    < >  = values in this interval not displayed.     Magnesium:  No results for input(s): \"MG\" in the last 72 hours.    BMP:  Recent Labs     08/20/24  0936 08/22/24  0509 08/22/24  0633    146*  --    K 3.9 3.0* 3.4*   CALCIUM 8.4* 7.5*  --    CO2 31 32*  --    BUN 13 5*  --    CREATININE 0.4* <0.4*  --    LABGLOM >90 Can not be calculated  --    GLUCOSE 153* 105*  --      BNP:No results for input(s): \"BNP\", \"PROBNP\" in the last 72 hours.  PT/INR:  No results for input(s): \"PROTIME\", \"INR\" in the last 72 hours.    APTT:  No results for input(s): \"APTT\" in the last 72 hours.    CARDIAC ENZYMES:  No results for input(s): \"MYOGLOBIN\", \"CKTOTAL\", \"CKMB\", \"CKMBINDEX\", \"TROPHS\", \"TROPONINT\" in the last 72 hours.    FASTING LIPID PANEL:No results found for: \"HDL\", \"LDLDIRECT\", \"TRIG\"  LIVER PROFILE:  No results for input(s): \"AST\", \"ALT\", \"LABALBU\", \"ALKPHOS\", \"BILITOT\", \"BILIDIR\", \"IBILI\", \"GLOB\", \"ALBUMIN\" in the last 72 hours.    Invalid input(s): \"PROT\"       ASSESSMENT:  Mild troponin elevation  - 100-118 in setting of acute anemia/GI bleed  - likely demand ischemia  GI bleed/hemorrhage  - Hb 5.7. Managed by others  Hypovolemic Shock  - lactate 5.1, improving  Hypertension  Short runs of paroxysmal SVT      PLAN:  Continue current medications.  Cont Coreg 6.25mg PO BID . Bp is now at goal  Cont Losartan at 100mg PO OD as home dose  Will adjust meds as needed  No acute cardiac issues at this time and no plans for ischemic eval as IP  Cont discharge planning      Discussed with patient and family    Daryl Conley MD

## 2024-08-22 NOTE — CARE COORDINATION
Social work: Spoke to [facility], they are able to accept patient.   SW will submit for precert when ready.  HENS started.   Dtr and pt updated and agreeable to plan.   Per RN, patient is not dc ready.

## 2024-08-22 NOTE — PLAN OF CARE
Pt up with assist and walker. Oriented x 4. Hg stable 9.9. q 12 hr draw from PICC. Pt weaned to 2L/NC, attempted room air, pt oxygen dropped into 80's. O2 now WDL. Pt tolerating diet, advanced to regular. K 3.4 this AM. Per protocol 4 bags of replacement given. Pt having pain in abdomen. PRN Tylenol and Fentanyl given.     Problem: Discharge Planning  Goal: Discharge to home or other facility with appropriate resources  Outcome: Progressing     Problem: Skin/Tissue Integrity  Goal: Absence of new skin breakdown  Description: 1.  Monitor for areas of redness and/or skin breakdown  Outcome: Progressing       Problem: Safety - Adult  Goal: Free from fall injury  Outcome: Progressing     Problem: Pain  Goal: Verbalizes/displays adequate comfort level or baseline comfort level  Outcome: Progressing  Flowsheets (Taken 8/22/2024 0409 by Maria Dolores Anguiano, RN)  Verbalizes/displays adequate comfort level or baseline comfort level: Encourage patient to monitor pain and request assistance  Patient having pain on their abdomen and rates it a 8. Pain interventions includefrequent position changes, correct body alignment/body mechanics, relaxation techniques, medication, regular rest periods, and medicate per physician recommendation/order. Patients goal for pain relief is 1. The need for pain and symptom management will be considered in the discharge planning process to ensure patients comfort.        Problem: Neurosensory - Adult  Goal: Achieves stable or improved neurological status  Outcome: Progressing  Flowsheets (Taken 8/22/2024 0800)  Achieves stable or improved neurological status: Assess for and report changes in neurological status     Problem: Neurosensory - Adult  Goal: Achieves maximal functionality and self care  Outcome: Progressing     Problem: Cardiovascular - Adult  Goal: Maintains optimal cardiac output and hemodynamic stability  Outcome: Progressing  Flowsheets (Taken 8/22/2024 0800)  Maintains optimal cardiac

## 2024-08-22 NOTE — PLAN OF CARE
Problem: Discharge Planning  Goal: Discharge to home or other facility with appropriate resources  Outcome: Progressing  Flowsheets (Taken 8/21/2024 2000)  Discharge to home or other facility with appropriate resources: Identify barriers to discharge with patient and caregiver     Problem: Skin/Tissue Integrity  Goal: Absence of new skin breakdown  Description: 1.  Monitor for areas of redness and/or skin breakdown  2.  Assess vascular access sites hourly  3.  Every 4-6 hours minimum:  Change oxygen saturation probe site  4.  Every 4-6 hours:  If on nasal continuous positive airway pressure, respiratory therapy assess nares and determine need for appliance change or resting period.  Outcome: Progressing     Problem: Safety - Adult  Goal: Free from fall injury  Outcome: Progressing     Problem: Pain  Goal: Verbalizes/displays adequate comfort level or baseline comfort level  Outcome: Progressing  Flowsheets  Taken 8/21/2024 1600 by Mitzy Bright RN  Verbalizes/displays adequate comfort level or baseline comfort level:   Encourage patient to monitor pain and request assistance   Assess pain using appropriate pain scale  Taken 8/21/2024 1200 by Mitzy Bright RN  Verbalizes/displays adequate comfort level or baseline comfort level:   Encourage patient to monitor pain and request assistance   Assess pain using appropriate pain scale     Problem: Neurosensory - Adult  Goal: Achieves stable or improved neurological status  Outcome: Progressing  Flowsheets (Taken 8/21/2024 2000)  Achieves stable or improved neurological status: Assess for and report changes in neurological status  Goal: Achieves maximal functionality and self care  Outcome: Progressing  Flowsheets (Taken 8/21/2024 2000)  Achieves maximal functionality and self care: Monitor swallowing and airway patency with patient fatigue and changes in neurological status     Problem: Cardiovascular - Adult  Goal: Maintains optimal cardiac output and

## 2024-08-22 NOTE — PROGRESS NOTES
Occupational Therapy  Facility/Department: [facility] ICU  Rehabilitation Occupational Therapy Daily Treatment Note    Date: 24  Patient Name: Rosa Maria Reynolds       Room: 1109/1109-01  MRN: [id number]  Account: [id number]   : 1945  (78 y.o.) Gender: female                    Past Medical History:  has no past medical history on file.  Past Surgical History:   has a past surgical history that includes Upper gastrointestinal endoscopy (N/A, 2024) and Upper gastrointestinal endoscopy (N/A, 2024).    Restrictions  Restrictions/Precautions: Fall Risk, Bed Alarm, General Precautions, Up as Tolerated  Other position/activity restrictions: IV, telemetry, BP cuff, up with assist  Required Braces or Orthoses?: No    Subjective  Subjective: Pt in bed upon arrival and agreeable to get up for breakfast. Pt very sleepy/drowsy. RN in during session to give morning meds.  Restrictions/Precautions: Fall Risk;Bed Alarm;General Precautions;Up as Tolerated             Objective     Cognition  Overall Cognitive Status: Exceptions  Arousal/Alertness: Appropriate responses to stimuli;Delayed responses to stimuli  Following Commands: Follows one step commands with repetition;Follows one step commands with increased time  Attention Span: Appears intact  Memory: Decreased recall of recent events;Decreased short term memory  Safety Judgement: Decreased awareness of need for safety  Problem Solving: Assistance required to identify errors made;Assistance required to correct errors made;Decreased awareness of errors  Insights: Decreased awareness of deficits  Initiation: Requires cues for some  Sequencing: Requires cues for some  Orientation  Overall Orientation Status: Within Functional Limits   Perception  Overall Perceptual Status: Impaired  Initiation: Cues to initiate tasks     ADL  Grooming/Oral Hygiene  Assistance Level: Set-up;Verbal cues;Increased time to complete;Minimal assistance  Skilled Clinical Factors: seated EOB  but remains limited by decreased endurance, global weakness, fatigue and impaired safety/cognition. Pt req'd SBA with bed mobility, Mod-Max A with LB care, Min A with UB care/grooming tasks seated EOB and CGA during functional transfers/STSs using RW. Vitals upon arrival in bed: 160/64 (94), ( 79, RR 9, SPO2 97% on 1L O2. Vitals at end of session in chair: 169/72 (100), P 86, RR 13 SPO2 97%. RN present at writer's exit.  Skilled OT indicated to increase safety and IND with all functional tasks to ensure a safe return to OF.  Activity Tolerance: Patient limited by fatigue;Patient limited by endurance  Discharge Recommendations: Patient would benefit from continued therapy after discharge  Safety Devices  Safety Devices in place: Yes  Type of devices: All fall risk precautions in place;Left in chair;Nurse notified;Call light within reach;Chair alarm in place;Patient at risk for falls    Patient Education  Education  Education Given To: Patient  Education Provided: Transfer Training;Energy Conservation;Home Exercise Program;Precautions;Safety;Equipment;ADL Function;Fall Prevention Strategies;Mobility Training  Education Method: Verbal;Teach Back;Printed Information/Hand-outs  Barriers to Learning: Cognition;Hearing  Education Outcome: Verbalized understanding;Demonstrated understanding;Continued education needed  Skilled Clinical Factors: Left HEP and "[employer]bridge handouts in room for pt's daughter to review, will address with pt when more alert.  Access Code: [email or web address]  URL: [email or web address]  Date: 08/22/2024  Exercises  - Wrist AROM Flexion Extension  - 1 x daily - 7 x weekly - 3 sets - 10 reps  - Seated Forearm Pronation and Supination AROM  - 1 x daily - 7 x weekly - 3 sets - 10 reps  - Seated Elbow Flexion and Extension AROM  - 1 x daily - 7 x weekly - 3 sets - 10 reps  - Seated Shoulder Shrugs  - 1 x daily - 7 x weekly - 3 sets - 10 reps  - Seated Shoulder Flexion  - 1 x daily - 7 x weekly - 3 sets - 10

## 2024-08-22 NOTE — PROGRESS NOTES
[facility]  Office: [phone]  Randall Lundberg DO, Delon Robbins DO, Donte Joseph DO, Lexa Gamez DO, Shelli Gant MD, Shruti Soliz MD, Brandon Peter MD, Mery Hutchinson MD,  Rene Alas MD, Malick Chris MD, Mahsa Barrett MD,  Salome Rodriguez DO, Felicity Rangel MD, Mynor Mcmillan MD, Rohan Lundberg DO, Nati Arnold MD,  Antwan Rowan DO, Catrina Terry MD, Reny Olmstead MD, Rupali Trent MD, Raissa Cunningham MD,  Odilon Yee MD, Nick Wade MD, Surekha Ibrahim MD, Vickey Quintero MD, Joseph Pugh MD, Randy Regalado MD, Rene Black DO, Ramiro Munson DO, Quan Ravi MD,  Esteban Aranda MD, Shirley Waterhouse, CNP,  Sallie Romero CNP, Tadeo Sherman, CNP,  Polly Ramsey, POLINA, Loli Beltran, CNP, Natividad Gill, CNP, Yessica Juárez CNP, Marguerite Morelos, CNP, Jackeline Levin, PA-C, Sheila Neff PA-C, Jazmine Ashford, CNP, Nathalie Meredith, CNP, Norma Gauthier, CNP, Kristin Lawrence, CNP, Tala Hoover, CNS, Bhavani Rodriguez, TORITO, Gretta Palacios CNP, Tracy Schwab, CNP         [facility]   IN-PATIENT SERVICE   [facility]    Progress Note    8/22/2024    9:43 AM    Name:   Rosa Maria Reynolds  MRN:     [id number]     Acct:      [id number]   Room:   1109/1109-01   Day:  7  Admit Date:  8/15/2024  6:31 PM    PCP:   No primary care provider on file.  Code Status:  DNR-CCA    Subjective:     C/C: gi bleed  Interval History Status: not changed.     Denies cp/sob/n/v  No further bleeding  Does have some left lower rib pain  Abd pain present still; had buq pain intermittently    Brief History:     Per my DAVION:  \"Rosa Maria Joya is a 78 y.o. Unavailable / unknown female who presents with No chief complaint on file.   and is admitted to the hospital for the management of Upper GI bleed.     Patient presents as a transfer from [facility] where she initially presented with hematemesis. She was recently discharged home with coming hospital on 8/12/2024  status post mechanical fall with broken ribs.  She does have a past medical history significant for peptic ulcer disease with previous endoscopies.  Upon arrival to outside facility patient's hemoglobin returned at 5.7, WBC 15.2, INR 1.4, creatinine 0.8, sodium 134, lactic acid 5.1 with a negative ethanol level. An EKG revealed normal sinus rhythm with diffuse ST changes without acute injury.\"    Review of Systems:     Constitutional:  negative for chills, fevers, sweats  Respiratory:  negative for cough, dyspnea on exertion, shortness of breath, wheezing  Cardiovascular:  negative for chest pain, chest pressure/discomfort, lower extremity edema, palpitations  Gastrointestinal:  negative for constipation, diarrhea, nausea, vomiting  Neurological:  negative for dizziness, headache    Medications:     Allergies:    Allergies   Allergen Reactions    Keflex [Cephalexin]        Current Meds:   Scheduled Meds:    carvedilol  6.25 mg Oral BID WC    sodium chloride  80 mL IntraVENous Once    pantoprazole  40 mg Oral QAM AC    losartan  100 mg Oral Daily    thiamine mononitrate  100 mg Oral Daily    folic acid  1 mg Oral Daily    nicotine  1 patch TransDERmal Daily    sodium chloride flush  5-40 mL IntraVENous 2 times per day     Continuous Infusions:    sodium chloride 10 mL/hr at 08/22/24 0853     PRN Meds: fentanNYL, sodium phosphate 8.67 mmol in sodium chloride 0.9 % 250 mL IVPB **OR** sodium phosphate 17.34 mmol in sodium chloride 0.9 % 250 mL IVPB, potassium chloride, magnesium sulfate, metoprolol, perflutren lipid microspheres, acetaminophen **OR** acetaminophen, sodium chloride flush, sodium chloride, ondansetron **OR** ondansetron    Data:     Past Medical History:   has no past medical history on file.    Social History:   reports that she has been smoking cigarettes. She has never used smokeless tobacco. She reports current alcohol use. She reports that she does not use drugs.     Family History: No family history  incidental or low-grade ileus. 2.  Previous NG tube has been removed.     XR ACUTE ABD SERIES CHEST 1 VW    Result Date: 8/16/2024  Congestive heart failure which appears more progressive from prior exam Possible developing adynamic ileus     XR CHEST PORTABLE    Result Date: 8/16/2024  Moderate CHF       Physical Examination:        General appearance:  alert, cooperative and no distress  Mental Status:  oriented to person, place and time and normal affect  Lungs:  clear to auscultation bilaterally, normal effort  Heart:  regular rate and rhythm, no murmur  Abdomen:  soft, mildly ttp buq, nondistended, normal bowel sounds, no masses, hepatomegaly, splenomegaly  Extremities:  no edema, redness, tenderness in the calves  Skin:  no gross lesions, rashes, induration    Assessment:        Hospital Problems             Last Modified POA    * (Principal) Upper GI bleed 8/15/2024 Yes    Hypertension 8/16/2024 Yes    Gastroesophageal reflux disease with esophagitis 8/16/2024 Yes    Alcohol use 8/16/2024 Yes    Smoker 8/16/2024 Yes    Excessive use of nonsteroidal anti-inflammatory drug (NSAID) 8/16/2024 Yes    Troponin level elevated 8/16/2024 Yes       Plan:        Protonix drip switched to po  Repeat egd not needed per GI  Check cxr as ct abdomen suggested effusions  Dc planning to snf vs home 24h    Lexa Gamez DO  8/22/2024  9:43 AM

## 2024-08-23 LAB
ANION GAP SERPL CALCULATED.3IONS-SCNC: 5 MMOL/L (ref 9–17)
BUN SERPL-MCNC: 5 MG/DL (ref 8–23)
BUN/CREAT SERPL: ABNORMAL (ref 9–20)
CALCIUM SERPL-MCNC: 8.4 MG/DL (ref 8.6–10.4)
CHLORIDE SERPL-SCNC: 104 MMOL/L (ref 98–107)
CO2 SERPL-SCNC: 34 MMOL/L (ref 20–31)
CREAT SERPL-MCNC: <0.4 MG/DL (ref 0.5–0.9)
ERYTHROCYTE [DISTWIDTH] IN BLOOD BY AUTOMATED COUNT: 15.1 % (ref 11.8–14.4)
GFR, ESTIMATED: ABNORMAL ML/MIN/1.73M2
GLUCOSE SERPL-MCNC: 114 MG/DL (ref 70–99)
HCT VFR BLD AUTO: 31.9 % (ref 36.3–47.1)
HGB BLD-MCNC: 10.1 G/DL (ref 11.9–15.1)
MCH RBC QN AUTO: 30.1 PG (ref 25.2–33.5)
MCHC RBC AUTO-ENTMCNC: 31.7 G/DL (ref 28.4–34.8)
MCV RBC AUTO: 95.2 FL (ref 82.6–102.9)
NRBC BLD-RTO: 0 PER 100 WBC
PLATELET # BLD AUTO: 269 K/UL (ref 138–453)
PMV BLD AUTO: 8.9 FL (ref 8.1–13.5)
POTASSIUM SERPL-SCNC: 3.8 MMOL/L (ref 3.7–5.3)
RBC # BLD AUTO: 3.35 M/UL (ref 3.95–5.11)
SODIUM SERPL-SCNC: 143 MMOL/L (ref 135–144)
WBC OTHER # BLD: 8.9 K/UL (ref 3.5–11.3)

## 2024-08-23 PROCEDURE — 6370000000 HC RX 637 (ALT 250 FOR IP): Performed by: INTERNAL MEDICINE

## 2024-08-23 PROCEDURE — 2580000003 HC RX 258: Performed by: INTERNAL MEDICINE

## 2024-08-23 PROCEDURE — 6370000000 HC RX 637 (ALT 250 FOR IP): Performed by: NURSE PRACTITIONER

## 2024-08-23 PROCEDURE — 97110 THERAPEUTIC EXERCISES: CPT

## 2024-08-23 PROCEDURE — 6360000002 HC RX W HCPCS: Performed by: INTERNAL MEDICINE

## 2024-08-23 PROCEDURE — 85014 HEMATOCRIT: CPT

## 2024-08-23 PROCEDURE — 85018 HEMOGLOBIN: CPT

## 2024-08-23 PROCEDURE — 85027 COMPLETE CBC AUTOMATED: CPT

## 2024-08-23 PROCEDURE — 80048 BASIC METABOLIC PNL TOTAL CA: CPT

## 2024-08-23 PROCEDURE — 6370000000 HC RX 637 (ALT 250 FOR IP): Performed by: STUDENT IN AN ORGANIZED HEALTH CARE EDUCATION/TRAINING PROGRAM

## 2024-08-23 PROCEDURE — 94761 N-INVAS EAR/PLS OXIMETRY MLT: CPT

## 2024-08-23 PROCEDURE — 99232 SBSQ HOSP IP/OBS MODERATE 35: CPT | Performed by: INTERNAL MEDICINE

## 2024-08-23 PROCEDURE — 2700000000 HC OXYGEN THERAPY PER DAY

## 2024-08-23 PROCEDURE — 97530 THERAPEUTIC ACTIVITIES: CPT

## 2024-08-23 PROCEDURE — 2060000000 HC ICU INTERMEDIATE R&B

## 2024-08-23 PROCEDURE — 97535 SELF CARE MNGMENT TRAINING: CPT

## 2024-08-23 RX ORDER — LANOLIN ALCOHOL/MO/W.PET/CERES
6 CREAM (GRAM) TOPICAL NIGHTLY PRN
Status: DISCONTINUED | OUTPATIENT
Start: 2024-08-23 | End: 2024-08-24 | Stop reason: HOSPADM

## 2024-08-23 RX ORDER — CARVEDILOL 12.5 MG/1
12.5 TABLET ORAL 2 TIMES DAILY WITH MEALS
Status: DISCONTINUED | OUTPATIENT
Start: 2024-08-23 | End: 2024-08-24 | Stop reason: HOSPADM

## 2024-08-23 RX ADMIN — LOSARTAN POTASSIUM 100 MG: 100 TABLET, FILM COATED ORAL at 08:29

## 2024-08-23 RX ADMIN — CARVEDILOL 12.5 MG: 12.5 TABLET, FILM COATED ORAL at 16:38

## 2024-08-23 RX ADMIN — CARVEDILOL 6.25 MG: 6.25 TABLET, FILM COATED ORAL at 08:30

## 2024-08-23 RX ADMIN — SODIUM CHLORIDE, PRESERVATIVE FREE 10 ML: 5 INJECTION INTRAVENOUS at 20:21

## 2024-08-23 RX ADMIN — Medication 6 MG: at 21:30

## 2024-08-23 RX ADMIN — FENTANYL CITRATE 25 MCG: 0.05 INJECTION, SOLUTION INTRAMUSCULAR; INTRAVENOUS at 20:17

## 2024-08-23 RX ADMIN — ACETAMINOPHEN 650 MG: 325 TABLET ORAL at 09:48

## 2024-08-23 RX ADMIN — LIDOCAINE HYDROCHLORIDE 15 ML: 20 SOLUTION ORAL at 04:04

## 2024-08-23 RX ADMIN — ACETAMINOPHEN 650 MG: 325 TABLET ORAL at 05:04

## 2024-08-23 RX ADMIN — PANTOPRAZOLE SODIUM 40 MG: 40 TABLET, DELAYED RELEASE ORAL at 04:41

## 2024-08-23 RX ADMIN — Medication 100 MG: at 08:30

## 2024-08-23 RX ADMIN — FENTANYL CITRATE 25 MCG: 0.05 INJECTION, SOLUTION INTRAMUSCULAR; INTRAVENOUS at 21:29

## 2024-08-23 RX ADMIN — SODIUM CHLORIDE, PRESERVATIVE FREE 10 ML: 5 INJECTION INTRAVENOUS at 08:30

## 2024-08-23 RX ADMIN — FOLIC ACID 1 MG: 1 TABLET ORAL at 08:29

## 2024-08-23 RX ADMIN — FENTANYL CITRATE 25 MCG: 0.05 INJECTION, SOLUTION INTRAMUSCULAR; INTRAVENOUS at 01:42

## 2024-08-23 RX ADMIN — FENTANYL CITRATE 25 MCG: 0.05 INJECTION, SOLUTION INTRAMUSCULAR; INTRAVENOUS at 08:53

## 2024-08-23 RX ADMIN — ALUMINUM HYDROXIDE, MAGNESIUM HYDROXIDE, AND SIMETHICONE 30 ML: 1200; 120; 1200 SUSPENSION ORAL at 04:04

## 2024-08-23 ASSESSMENT — PAIN DESCRIPTION - DESCRIPTORS
DESCRIPTORS: ACHING
DESCRIPTORS: ACHING
DESCRIPTORS: SHARP
DESCRIPTORS: SHARP
DESCRIPTORS: ACHING
DESCRIPTORS: SHARP
DESCRIPTORS: ACHING;SHARP

## 2024-08-23 ASSESSMENT — PAIN SCALES - GENERAL
PAINLEVEL_OUTOF10: 8
PAINLEVEL_OUTOF10: 0
PAINLEVEL_OUTOF10: 0
PAINLEVEL_OUTOF10: 8
PAINLEVEL_OUTOF10: 9
PAINLEVEL_OUTOF10: 3

## 2024-08-23 ASSESSMENT — PAIN - FUNCTIONAL ASSESSMENT
PAIN_FUNCTIONAL_ASSESSMENT: ACTIVITIES ARE NOT PREVENTED
PAIN_FUNCTIONAL_ASSESSMENT: ACTIVITIES ARE NOT PREVENTED
PAIN_FUNCTIONAL_ASSESSMENT: PREVENTS OR INTERFERES SOME ACTIVE ACTIVITIES AND ADLS
PAIN_FUNCTIONAL_ASSESSMENT: ACTIVITIES ARE NOT PREVENTED
PAIN_FUNCTIONAL_ASSESSMENT: PREVENTS OR INTERFERES SOME ACTIVE ACTIVITIES AND ADLS

## 2024-08-23 ASSESSMENT — PAIN DESCRIPTION - LOCATION
LOCATION: ABDOMEN

## 2024-08-23 ASSESSMENT — PAIN DESCRIPTION - PAIN TYPE
TYPE: ACUTE PAIN

## 2024-08-23 ASSESSMENT — PAIN DESCRIPTION - ONSET
ONSET: ON-GOING

## 2024-08-23 ASSESSMENT — PAIN DESCRIPTION - ORIENTATION
ORIENTATION: MID

## 2024-08-23 ASSESSMENT — PAIN DESCRIPTION - FREQUENCY
FREQUENCY: CONTINUOUS

## 2024-08-23 NOTE — PLAN OF CARE
Problem: Discharge Planning  Goal: Discharge to home or other facility with appropriate resources  8/23/2024 0935 by Devyn Moore RN  Outcome: Progressing  8/22/2024 2229 by Inga Luna RN  Outcome: Progressing     Problem: Skin/Tissue Integrity  Goal: Absence of new skin breakdown  Description: 1.  Monitor for areas of redness and/or skin breakdown  2.  Assess vascular access sites hourly  3.  Every 4-6 hours minimum:  Change oxygen saturation probe site  4.  Every 4-6 hours:  If on nasal continuous positive airway pressure, respiratory therapy assess nares and determine need for appliance change or resting period.  8/23/2024 0935 by Devyn Moore RN  Outcome: Progressing  8/22/2024 2229 by Inga Luna RN  Outcome: Progressing     Problem: Safety - Adult  Goal: Free from fall injury  8/23/2024 0935 by Devyn Moore RN  Outcome: Progressing  8/22/2024 2229 by Inga Luna RN  Outcome: Progressing     Problem: Pain  Goal: Verbalizes/displays adequate comfort level or baseline comfort level  8/23/2024 0935 by Devyn Moore RN  Outcome: Progressing  8/22/2024 2229 by Inga Luna RN  Outcome: Progressing     Problem: Neurosensory - Adult  Goal: Achieves stable or improved neurological status  8/23/2024 0935 by Devyn Moore RN  Outcome: Progressing  8/22/2024 2229 by Inga Luna RN  Outcome: Progressing  Goal: Achieves maximal functionality and self care  8/23/2024 0935 by Devyn Moore RN  Outcome: Progressing  8/22/2024 2229 by Inga Luna RN  Outcome: Progressing     Problem: Cardiovascular - Adult  Goal: Maintains optimal cardiac output and hemodynamic stability  8/23/2024 0935 by Devyn Moore RN  Outcome: Progressing  8/22/2024 2229 by Inga Luna RN  Outcome: Progressing     Problem: Skin/Tissue Integrity - Adult  Goal: Skin integrity remains intact  8/23/2024 0935 by Devyn Moore RN  Outcome: Progressing  8/22/2024 2229 by

## 2024-08-23 NOTE — CARE COORDINATION
DC Planning    Discussed w TIM-pt will have a very high down payment for [facility] $4,000+. She will follow up on alternate snf choices.

## 2024-08-23 NOTE — PROGRESS NOTES
End Of Shift Note  [facility]  Summary of shift: Pt has been calm and cooperative. Compliant with meds and txs. Denied pain or further needs. Call light in reach. Checked on for safety    Vitals:    Vitals:    08/23/24 1544 08/23/24 1638 08/23/24 1655 08/23/24 1710   BP:  (!) 143/72     Pulse:  91  87   Resp:    10   Temp: 98.2 °F (36.8 °C)      TempSrc: Oral      SpO2:    97%   Weight:       Height:   1.524 m (5')         I&O:   Intake/Output Summary (Last 24 hours) at 8/23/2024 1902  Last data filed at 8/23/2024 1753  Gross per 24 hour   Intake 10 ml   Output 400 ml   Net -390 ml       Resp Status: 2L nc    Ventilator Settings:     / / /     Critical Care IV infusions:   sodium chloride 10 mL/hr at 08/22/24 0853        LDA:   PICC 08/16/24 Right Brachial (Active)   Number of days: 6

## 2024-08-23 NOTE — CARE COORDINATION
Social work; spoke to daughter in law Jenn to get social security number which was updated by Registration in chart. And advise that Snf in  was out of network and would cost $4,200 on first day of admission and 50% of entire stay if pt wants to go out of network. Family confimed ok to look for other in networks snfs is  and [place]. [facility] is in network and are reviewing now. Will try other snf's also to see if anyone else is still in network. Will need elizabeth and Rx at discharge once auth is granted.  Polly bee    Social work: confirmed that call did not come from [facility] [place]. The copay there is $10 per day until day 21 then it is likely to increase to $203  per day. Will call family and confirm that change in cost figures. Still need precert.  Will follow. Left voicemail for daughter in law with updated financial information. Polly bee

## 2024-08-23 NOTE — PROGRESS NOTES
Section of Cardiology  Progress Note      Date:  8/23/2024  Patient: Rosa Maria Reynolds  Admission:  8/15/2024  6:31 PM  Admit DX: Upper GI bleed [K92.2]  Age:  78 y.o., 1945     LOS: 8 days     Reason for evaluation:   Troponin elevation, HTN      SUBJECTIVE:     The patient was seen and examined. Notes and labs reviewed.  BP and HR have been increased today  She has been having abdominal pain  No CP, palpitations, headaches, edema  Overall stable from a cardiac standpoint        OBJECTIVE:      EXAM:   Vitals:    VITALS:  BP (!) 130/59   Pulse 70   Temp 97.3 °F (36.3 °C) (Temporal)   Resp (!) 8   Ht 1.524 m (5')   Wt 51.1 kg (112 lb 9.6 oz)   SpO2 98%   BMI 21.99 kg/m²   24HR INTAKE/OUTPUT:    Intake/Output Summary (Last 24 hours) at 8/23/2024 0823  Last data filed at 8/22/2024 1023  Gross per 24 hour   Intake 480 ml   Output --   Net 480 ml       CONSTITUTIONAL:  awake, alert, confused, no distress  HEENT: Normal jugular venous pulsations, no carotid bruits.   LUNGS: Good respiratory effort On auscultation: clear to auscultation bilaterally  CARDIOVASCULAR:  Normal apical impulse, regular rate and rhythm, normal S1 and S2, no S3 or S4, and no murmur or rub noted.  ABDOMEN: Soft, nontender, nondistended.   SKIN: Warm and dry.  EXTREMITIES:No lower extremity edema.     Current Inpatient Medications:   carvedilol  6.25 mg Oral BID WC    sodium chloride  80 mL IntraVENous Once    pantoprazole  40 mg Oral QAM AC    losartan  100 mg Oral Daily    thiamine mononitrate  100 mg Oral Daily    folic acid  1 mg Oral Daily    nicotine  1 patch TransDERmal Daily    sodium chloride flush  5-40 mL IntraVENous 2 times per day       IV Infusions (if any):   sodium chloride 10 mL/hr at 08/22/24 0853       Diagnostics:   Telemetry: NSR/tachycardia with short runs of pSVT      Labs:   CBC:  Recent Labs     08/22/24  0509 08/22/24  1713 08/23/24  0415   WBC 10.2  --  8.9   HGB 9.9* 10.5* 10.1*   HCT 31.1* 32.7* 31.9*

## 2024-08-23 NOTE — PROGRESS NOTES
GASTROENTEROLOGY NOTE       Patient:   Rosa Maria Reynolds   :    1945   Facility:   [facility]  Date:     2024  Consultant:   WILLIAM CHRISTIAN      SUBJECTIVE:    Patient seen and examined.   Sitting in in chair, pain improved, she was able to eat half of a [employer] cheeseburger yesterday.   CM working on SNF for discharge.   No melena or hematochezia.   No family present at time I saw her.     CT a/p 2 days ago.   IMPRESSION:  1. Questionable mild circumferential rectal wall thickening with perirectal  stranding. This could represent proctitis.  2. Moderate to large bilateral pleural effusions with bibasilar  consolidations which could represent atelectasis or pneumonia.  3. Small volume of ascites and extensive subcutaneous edema suggesting  anasarca.  4. Acute mildly displaced left lateral 6th and 7th rib fractures.  5. Mild to moderate age-indeterminate possibly acute T11 compression  fracture. No retropulsion.  If clinically indicated further evaluation could  be obtained with MRI.  6. 3.4 cm right adrenal nodule.  See recommendations below.  7. Cholelithiasis without evidence of acute cholecystitis.     RECOMMENDATIONS:  Right adrenal mass measuring 3.4 cm, probable benign adenoma. Recommend  adrenal washout CT or chemical shift MRI.     JACR 2017 Aug;   OBJECTIVE:   Vital Signs:  BP (!) 151/71   Pulse 77   Temp 97.3 °F (36.3 °C) (Temporal)   Resp 10   Ht 1.524 m (5')   Wt 51.1 kg (112 lb 9.6 oz)   SpO2 97%   BMI 21.99 kg/m²      Physical Exam:   General appearance: Alert, NAD  Lungs: diminished bases on supplemental O2  Heart: S1S2, RRR   Abdomen: Soft, anasarca minimal ttp luq  Skin/Musculoskeletal:  No jaundice. No clubbing, cyanosis, or edema.      Lab and Imaging Review   Recent Labs     24  2135 24  0518 24  1730 24  0509 24  0633 24  1713 24  0415   WBC  --   --   --  10.2  --   --  8.9   HGB 10.6* 10.8* 10.0* 9.9*  --   10.5* 10.1*   MCV  --   --   --  94.8  --   --  95.2   PLT  --   --   --  278  --   --  269   NA  --   --   --  146*  --   --  143   K  --   --   --  3.0* 3.4*  --  3.8   CL  --   --   --  109*  --   --  104   CO2  --   --   --  32*  --   --  34*   BUN  --   --   --  5*  --   --  5*   CREATININE  --   --   --  <0.4*  --   --  <0.4*   GLUCOSE  --   --   --  105*  --   --  114*   CALCIUM  --   --   --  7.5*  --   --  8.4*     No results for input(s): \"INR\", \"PROTIME\" in the last 72 hours.      Impression:    Massive acute upper GI bleed last week secondary to actively bleeding exposed vessel in the duodenum (see above).  Hemostasis achieved with EGD.  Acute blood loss anemia.  Hb stable now.  History of alcohol cirrhosis.  Fx rib pain.     PLAN:    Protonix 40mg PO daily indefinitely.   Patient tolerated her [employer] cheeseburger yesterday able to eat about half of it without worsening pain.   GI to sign off inpatient, will need outpatient GI f/u for etoh cirrhosis.     Ursula Vallejo CNP  Discussed with Dr. Manzano

## 2024-08-23 NOTE — PLAN OF CARE
Pt switched to regular diet yesterday, developed some distention and pain unresolved with pain medication. Pt switched back to clear liquid diet.   Pt given Maalox and Xylocaine in attempt to reduce pain.  Hgb resulted at 10.1 this shift.   Patient having pain on their abdomen and rates it a 8. Pain interventions include medication. Patients goal for pain relief is 1. The need for pain and symptom management will be considered in the discharge planning process to ensure patients comfort.      Problem: Discharge Planning  Goal: Discharge to home or other facility with appropriate resources  8/22/2024 2229 by Inga Luna RN  Outcome: Progressing     Problem: Skin/Tissue Integrity  Goal: Absence of new skin breakdown  Description: 1.  Monitor for areas of redness and/or skin breakdown  2.  Assess vascular access sites hourly  3.  Every 4-6 hours minimum:  Change oxygen saturation probe site  4.  Every 4-6 hours:  If on nasal continuous positive airway pressure, respiratory therapy assess nares and determine need for appliance change or resting period.  8/22/2024 2229 by Inga Luna RN  Outcome: Progressing     Problem: Safety - Adult  Goal: Free from fall injury  8/22/2024 2229 by Inga Luna RN  Outcome: Progressing     Problem: Pain  Goal: Verbalizes/displays adequate comfort level or baseline comfort level  8/22/2024 2229 by Inga Luna RN  Outcome: Progressing     Problem: Neurosensory - Adult  Goal: Achieves stable or improved neurological status  8/22/2024 2229 by Inga Luna RN  Outcome: Progressing     Problem: Neurosensory - Adult  Goal: Achieves maximal functionality and self care  8/22/2024 2229 by Inga Luna RN  Outcome: Progressing     Problem: Cardiovascular - Adult  Goal: Maintains optimal cardiac output and hemodynamic stability  8/22/2024 2229 by Inga Luna RN  Outcome: Progressing     Problem: Skin/Tissue Integrity - Adult  Goal:  Skin/Tissue Integrity - Adult  Goal: Skin integrity remains intact  8/22/2024 2229 by Inga Luna RN  Outcome: Progressing     Problem: Musculoskeletal - Adult  Goal: Return mobility to safest level of function  8/22/2024 2229 by Inga Luna RN  Outcome: Progressing     Problem: Musculoskeletal - Adult  Goal: Return ADL status to a safe level of function  8/22/2024 2229 by Inga Luna RN  Outcome: Progressing     Problem: Gastrointestinal - Adult  Goal: Minimal or absence of nausea and vomiting  8/22/2024 2229 by Inga Luna RN  Outcome: Progressing     Problem: Gastrointestinal - Adult  Goal: Maintains or returns to baseline bowel function  8/22/2024 2229 by Inga Luna RN  Outcome: Progressing     Problem: Genitourinary - Adult  Goal: Absence of urinary retention  8/22/2024 2229 by Inga Luna RN  Outcome: Progressing  Flowsheets (Taken 8/22/2024 2000)  Absence of urinary retention: Assess patient’s ability to void and empty bladder     Problem: Infection - Adult  Goal: Absence of infection during hospitalization  8/22/2024 2229 by Inga Luna RN  Outcome: Progressing     Problem: Metabolic/Fluid and Electrolytes - Adult  Goal: Electrolytes maintained within normal limits  8/22/2024 2229 by Inga Luna RN  Outcome: Progressing     Problem: Hematologic - Adult  Goal: Maintains hematologic stability  8/22/2024 2229 by Inga Luna RN  Outcome: Progressing     Problem: Chronic Conditions and Co-morbidities  Goal: Patient's chronic conditions and co-morbidity symptoms are monitored and maintained or improved  8/22/2024 2229 by Inga Luna RN  Outcome: Progressing

## 2024-08-23 NOTE — PROGRESS NOTES
[facility]  Office: [phone]  Randall Lundberg DO, Delon Robbins DO, Donte Joseph DO, Lexa Gamez DO, Shelli Gant MD, Shruti Soliz MD, Brandon Peter MD, Mery Hutchinson MD,  Rene Alas MD, Malick Chris MD, Mahsa Barrett MD,  Salome Rodriguez DO, Felicity Rangel MD, Mynor Mcmillan MD, Rohan Lundberg DO, Nati Arnold MD,  Antwan Rowan DO, Catrina Terry MD, Reny Olmstead MD, Rupali Trent MD, Raissa Cunningham MD,  Odilon Yee MD, Nick Wade MD, Surekha Ibrahim MD, Vickey Quintero MD, Joseph Pugh MD, Randy Regalado MD, Rene Black DO, Ramiro Munson DO, Quan Ravi MD,  Esteban Aranda MD, Shirley Waterhouse, CNP,  Sallie Romero CNP, Tadeo Sherman, CNP,  Polly Ramsey, POLINA, Loli Beltran, CNP, Natividad Gill, CNP, Yessica Juárez CNP, Marguerite Morelos, CNP, Jackeline Levin, PA-C, Sheila Neff PA-C, Jazmine Ashford, CNP, Nathalie Meredith, CNP, Norma Gauthier, CNP, Kristin Lawrence, CNP, Tala Hoover, CNS, Bhavani Rodriguez, TORITO, Gretta Palacios CNP, Tracy Schwab, CNP         [facility]   IN-PATIENT SERVICE   [facility]    Progress Note    8/23/2024    9:21 AM    Name:   Rosa Maria Reynolds  MRN:     [id number]     Acct:      [id number]   Room:   1109/1109-01   Day:  8  Admit Date:  8/15/2024  6:31 PM    PCP:   No primary care provider on file.  Code Status:  DNR-CCA    Subjective:     C/C: gi bleed  Interval History Status: not changed.     Denies cp/sob/n/v  No further bleeding  Does have some left lower rib pain  Abd pain present still; has had buq pain intermittently    Brief History:     Per my DAVION:  \"Rosa Maria Joya is a 78 y.o. Unavailable / unknown female who presents with No chief complaint on file.   and is admitted to the hospital for the management of Upper GI bleed.     Patient presents as a transfer from [facility] where she initially presented with hematemesis. She was recently discharged home with coming hospital on 8/12/2024  8/17/2024  1.  Gas-filled but nondilated small bowel and colon.  Could be incidental or low-grade ileus. 2.  Previous NG tube has been removed.     XR ACUTE ABD SERIES CHEST 1 VW    Result Date: 8/16/2024  Congestive heart failure which appears more progressive from prior exam Possible developing adynamic ileus     XR CHEST PORTABLE    Result Date: 8/16/2024  Moderate CHF       Physical Examination:        General appearance:  alert, cooperative and no distress  Mental Status:  oriented to person, place and time and normal affect  Lungs:  clear to auscultation bilaterally, normal effort  Heart:  regular rate and rhythm, no murmur  Abdomen:  soft, mildly ttp buq, nondistended, normal bowel sounds, no masses, hepatomegaly, splenomegaly  Extremities:  no edema, redness, tenderness in the calves  Skin:  no gross lesions, rashes, induration    Assessment:        Hospital Problems             Last Modified POA    * (Principal) Upper GI bleed 8/15/2024 Yes    Hypertension 8/16/2024 Yes    Gastroesophageal reflux disease with esophagitis 8/16/2024 Yes    Alcohol use 8/16/2024 Yes    Smoker 8/16/2024 Yes    Excessive use of nonsteroidal anti-inflammatory drug (NSAID) 8/16/2024 Yes    Troponin level elevated 8/16/2024 Yes       Plan:        Protonix drip switched to po  Repeat egd not needed per GI  Change to reg diet-tolerated cheeseburger last night  Dc planning to snf    Lexa Gamez DO  8/23/2024  9:21 AM

## 2024-08-24 VITALS
TEMPERATURE: 98.4 F | SYSTOLIC BLOOD PRESSURE: 145 MMHG | WEIGHT: 112.6 LBS | HEART RATE: 82 BPM | BODY MASS INDEX: 22.1 KG/M2 | HEIGHT: 60 IN | DIASTOLIC BLOOD PRESSURE: 57 MMHG | RESPIRATION RATE: 15 BRPM | OXYGEN SATURATION: 96 %

## 2024-08-24 PROBLEM — D62 ANEMIA DUE TO ACUTE BLOOD LOSS: Status: ACTIVE | Noted: 2024-08-24

## 2024-08-24 LAB
ANION GAP SERPL CALCULATED.3IONS-SCNC: 3 MMOL/L (ref 9–17)
BUN SERPL-MCNC: 5 MG/DL (ref 8–23)
BUN/CREAT SERPL: ABNORMAL (ref 9–20)
CALCIUM SERPL-MCNC: 8.3 MG/DL (ref 8.6–10.4)
CHLORIDE SERPL-SCNC: 105 MMOL/L (ref 98–107)
CO2 SERPL-SCNC: 35 MMOL/L (ref 20–31)
CREAT SERPL-MCNC: <0.4 MG/DL (ref 0.5–0.9)
GFR, ESTIMATED: ABNORMAL ML/MIN/1.73M2
GLUCOSE SERPL-MCNC: 108 MG/DL (ref 70–99)
HCT VFR BLD AUTO: 31.7 % (ref 36.3–47.1)
HCT VFR BLD AUTO: 33.4 % (ref 36.3–47.1)
HGB BLD-MCNC: 10 G/DL (ref 11.9–15.1)
HGB BLD-MCNC: 10.4 G/DL (ref 11.9–15.1)
POTASSIUM SERPL-SCNC: 3.7 MMOL/L (ref 3.7–5.3)
SODIUM SERPL-SCNC: 143 MMOL/L (ref 135–144)

## 2024-08-24 PROCEDURE — 6370000000 HC RX 637 (ALT 250 FOR IP): Performed by: STUDENT IN AN ORGANIZED HEALTH CARE EDUCATION/TRAINING PROGRAM

## 2024-08-24 PROCEDURE — 6370000000 HC RX 637 (ALT 250 FOR IP): Performed by: INTERNAL MEDICINE

## 2024-08-24 PROCEDURE — 80048 BASIC METABOLIC PNL TOTAL CA: CPT

## 2024-08-24 PROCEDURE — 2580000003 HC RX 258: Performed by: INTERNAL MEDICINE

## 2024-08-24 PROCEDURE — 99238 HOSP IP/OBS DSCHRG MGMT 30/<: CPT | Performed by: INTERNAL MEDICINE

## 2024-08-24 PROCEDURE — 6370000000 HC RX 637 (ALT 250 FOR IP): Performed by: NURSE PRACTITIONER

## 2024-08-24 RX ORDER — HYDROCODONE BITARTRATE AND ACETAMINOPHEN 5; 325 MG/1; MG/1
1 TABLET ORAL EVERY 6 HOURS PRN
Qty: 10 TABLET | Refills: 0 | Status: SHIPPED | OUTPATIENT
Start: 2024-08-24 | End: 2024-08-24

## 2024-08-24 RX ORDER — THIAMINE MONONITRATE (VIT B1) 100 MG
100 TABLET ORAL DAILY
DISCHARGE
Start: 2024-08-24

## 2024-08-24 RX ORDER — PANTOPRAZOLE SODIUM 40 MG/1
40 TABLET, DELAYED RELEASE ORAL DAILY
DISCHARGE
Start: 2024-08-24

## 2024-08-24 RX ORDER — FOLIC ACID 1 MG/1
1 TABLET ORAL DAILY
DISCHARGE
Start: 2024-08-24

## 2024-08-24 RX ORDER — CARVEDILOL 12.5 MG/1
12.5 TABLET ORAL 2 TIMES DAILY WITH MEALS
DISCHARGE
Start: 2024-08-24

## 2024-08-24 RX ORDER — HYDROCODONE BITARTRATE AND ACETAMINOPHEN 5; 325 MG/1; MG/1
1 TABLET ORAL EVERY 6 HOURS PRN
Status: DISCONTINUED | OUTPATIENT
Start: 2024-08-24 | End: 2024-08-24 | Stop reason: HOSPADM

## 2024-08-24 RX ORDER — SERTRALINE HYDROCHLORIDE 200 MG/1
1 CAPSULE ORAL DAILY
DISCHARGE
Start: 2024-08-24

## 2024-08-24 RX ORDER — LANOLIN ALCOHOL/MO/W.PET/CERES
6 CREAM (GRAM) TOPICAL NIGHTLY PRN
DISCHARGE
Start: 2024-08-24

## 2024-08-24 RX ORDER — HYDROCODONE BITARTRATE AND ACETAMINOPHEN 5; 325 MG/1; MG/1
1 TABLET ORAL EVERY 6 HOURS PRN
Qty: 10 TABLET | Refills: 0 | Status: SHIPPED | OUTPATIENT
Start: 2024-08-24 | End: 2024-08-27

## 2024-08-24 RX ADMIN — CARVEDILOL 12.5 MG: 12.5 TABLET, FILM COATED ORAL at 09:32

## 2024-08-24 RX ADMIN — LOSARTAN POTASSIUM 100 MG: 100 TABLET, FILM COATED ORAL at 09:32

## 2024-08-24 RX ADMIN — SODIUM CHLORIDE, PRESERVATIVE FREE 10 ML: 5 INJECTION INTRAVENOUS at 09:35

## 2024-08-24 RX ADMIN — SODIUM CHLORIDE, PRESERVATIVE FREE 10 ML: 5 INJECTION INTRAVENOUS at 09:32

## 2024-08-24 RX ADMIN — FOLIC ACID 1 MG: 1 TABLET ORAL at 09:32

## 2024-08-24 RX ADMIN — PANTOPRAZOLE SODIUM 40 MG: 40 TABLET, DELAYED RELEASE ORAL at 07:56

## 2024-08-24 RX ADMIN — Medication 100 MG: at 09:32

## 2024-08-24 RX ADMIN — HYDROCODONE BITARTRATE AND ACETAMINOPHEN 1 TABLET: 5; 325 TABLET ORAL at 09:38

## 2024-08-24 ASSESSMENT — PAIN SCALES - GENERAL: PAINLEVEL_OUTOF10: 6

## 2024-08-24 ASSESSMENT — PAIN DESCRIPTION - LOCATION
LOCATION: ABDOMEN

## 2024-08-24 ASSESSMENT — PAIN DESCRIPTION - PAIN TYPE
TYPE: ACUTE PAIN
TYPE: ACUTE PAIN

## 2024-08-24 ASSESSMENT — PAIN DESCRIPTION - FREQUENCY: FREQUENCY: CONTINUOUS

## 2024-08-24 ASSESSMENT — PAIN DESCRIPTION - DESCRIPTORS
DESCRIPTORS: ACHING
DESCRIPTORS: BURNING

## 2024-08-24 ASSESSMENT — PAIN DESCRIPTION - ORIENTATION
ORIENTATION: MID

## 2024-08-24 ASSESSMENT — PAIN DESCRIPTION - ONSET: ONSET: ON-GOING

## 2024-08-24 NOTE — DISCHARGE SUMMARY
[facility]  Office: [phone]  Randall Lundberg DO, Delon Robbins DO, Donte Joseph DO, Lexa Gamez DO, Shelli Gant MD, Shruti Soliz MD, Brandon Peter MD, Mery Hutchinson MD,  Rene Alas MD, Malick Chris MD, Mahsa Barrett MD,  Salome Rodriguez DO, Felicity Rangel MD, Mynor Mcmillan MD, Rohan Lundberg DO, Nati Arnold MD,  Antwan Rowan DO, Catrina Terry MD, Reny Olmstead MD, Rupali Trent MD, Raissa Cunningham MD,  Odilon Yee MD, Nick Wade MD, Surekha Ibrahim MD, Vickey Quintero MD, Joseph Pugh MD, Randy Regalado MD, Rene Black DO, Ramiro Munson DO, Quan Ravi MD,  Esteban Aranda MD, Shirley Waterhouse, CNP,  Sallie Romero CNP, Tadeo Sherman, CNP,  Polly Ramsey, POLINA, Loli Beltran, CNP, Natividad Gill, CNP, Yessica Juárez CNP, Marguerite Morelos CNP, Jackeline Levin, PA-C, Sheila Neff PA-C, Jazmine Ashford, CNP, Nathalie Meredith, CNP, Norma Gauthier, CNP, Kristin Lawrence CNP, Tala Hoover, CNS, Bhavani Rodriguez, CNP, Gretta Palacios CNP, Tracy Schwab, CNP         [facility]   IN-PATIENT SERVICE   [facility]    Discharge Summary     Patient ID: Rosa Maria Reynolds  :  1945   MRN: [id number]     ACCOUNT:  [id number]   Patient's PCP: No primary care provider on file.  Admit Date: 8/15/2024   Discharge Date: 2024     Length of Stay: 9  Code Status:  DNR-CCA  Admitting Physician: Lexa Gamez DO  Discharge Physician: Lexa Gamez DO     Active Discharge Diagnoses:     Hospital Problem Lists:  Principal Problem:    Upper GI bleed-duodenal  Active Problems:    Primary hypertension    Gastroesophageal reflux disease with esophagitis    Alcohol use    Smoker    Excessive use of nonsteroidal anti-inflammatory drug (NSAID)    Troponin level elevated    Anemia due to acute blood loss  Resolved Problems:    * No resolved hospital problems. *      Admission Condition:  serious     Discharged Condition: stable    Hospital Stay:

## 2024-08-24 NOTE — PROGRESS NOTES
End Of Shift Note  [facility]  Summary of shift: Uneventful shift. Pt has remained A/O x 4 and VSS. Pt received PRN fentanyl x 2 for abdominal pain. No BM and urine output was 600.     Vitals:    Vitals:    08/23/24 2017 08/23/24 2129 08/24/24 0000 08/24/24 0400   BP:   132/69 (!) 143/58   Pulse:   68 71   Resp: 17 11 (!) 8 (!) 9   Temp:   97.3 °F (36.3 °C) 97.7 °F (36.5 °C)   TempSrc:   Temporal Temporal   SpO2:   100% 100%   Weight:       Height:            I&O:   Intake/Output Summary (Last 24 hours) at 8/24/2024 0801  Last data filed at 8/24/2024 0548  Gross per 24 hour   Intake 10 ml   Output 1000 ml   Net -990 ml       Resp Status: 2 L NC     Ventilator Settings:     / / /     Critical Care IV infusions:   sodium chloride 10 mL/hr at 08/22/24 0853        LDA:   PICC 08/16/24 Right Brachial (Active)   Number of days: 7       External Urinary Catheter (Active)   Number of days: 0

## 2024-08-24 NOTE — PLAN OF CARE
Problem: Discharge Planning  Goal: Discharge to home or other facility with appropriate resources  8/24/2024 1509 by Jenn Nicholson RN  Outcome: Adequate for Discharge  8/24/2024 1508 by Jenn Nicholson RN  Outcome: Adequate for Discharge  Flowsheets (Taken 8/24/2024 0900)  Discharge to home or other facility with appropriate resources:   Identify barriers to discharge with patient and caregiver   Arrange for needed discharge resources and transportation as appropriate     Problem: Skin/Tissue Integrity  Goal: Absence of new skin breakdown  Description: 1.  Monitor for areas of redness and/or skin breakdown  2.  Assess vascular access sites hourly  3.  Every 4-6 hours minimum:  Change oxygen saturation probe site  4.  Every 4-6 hours:  If on nasal continuous positive airway pressure, respiratory therapy assess nares and determine need for appliance change or resting period.  8/24/2024 1509 by Jenn Nicholson RN  Outcome: Adequate for Discharge  8/24/2024 1508 by Jenn Nicholson RN  Outcome: Adequate for Discharge     Problem: Safety - Adult  Goal: Free from fall injury  8/24/2024 1509 by Jenn Nicholson RN  Outcome: Adequate for Discharge  8/24/2024 1508 by Jenn Nicholson RN  Outcome: Adequate for Discharge     Problem: Pain  Goal: Verbalizes/displays adequate comfort level or baseline comfort level  8/24/2024 1509 by Jenn Nicholson RN  Outcome: Adequate for Discharge  8/24/2024 1508 by Jenn Nicholson RN  Outcome: Adequate for Discharge  Flowsheets (Taken 8/24/2024 0900)  Verbalizes/displays adequate comfort level or baseline comfort level:   Encourage patient to monitor pain and request assistance   Assess pain using appropriate pain scale   Administer analgesics based on type and severity of pain and evaluate response   Implement non-pharmacological measures as appropriate and evaluate response     Problem: Neurosensory - Adult  Goal: Achieves stable or improved neurological  Assess patient stability and activity tolerance for standing, transferring and ambulating with or without assistive devices   Assist with transfers and ambulation using safe patient handling equipment as needed  Goal: Return ADL status to a safe level of function  8/24/2024 1509 by Jenn Nicholson RN  Outcome: Adequate for Discharge  8/24/2024 1508 by Jenn Nicholson RN  Outcome: Adequate for Discharge  Flowsheets (Taken 8/24/2024 0900)  Return ADL Status to a Safe Level of Function:   Administer medication as ordered   Assess activities of daily living deficits and provide assistive devices as needed     Problem: Gastrointestinal - Adult  Goal: Minimal or absence of nausea and vomiting  8/24/2024 1509 by Jenn Nicholson RN  Outcome: Adequate for Discharge  8/24/2024 1508 by Jenn Nicholson RN  Outcome: Adequate for Discharge  Flowsheets (Taken 8/24/2024 0900)  Minimal or absence of nausea and vomiting: Administer ordered antiemetic medications as needed  Goal: Maintains or returns to baseline bowel function  8/24/2024 1509 by Jenn Nicholson RN  Outcome: Adequate for Discharge  8/24/2024 1508 by Jenn Nicholson RN  Outcome: Adequate for Discharge  Flowsheets (Taken 8/24/2024 0900)  Maintains or returns to baseline bowel function: Assess bowel function     Problem: Genitourinary - Adult  Goal: Absence of urinary retention  8/24/2024 1509 by Jenn Nicholson RN  Outcome: Adequate for Discharge  8/24/2024 1508 by Jenn Nicholson RN  Outcome: Adequate for Discharge  Flowsheets (Taken 8/24/2024 0900)  Absence of urinary retention:   Assess patient’s ability to void and empty bladder   Monitor intake/output and perform bladder scan as needed     Problem: Infection - Adult  Goal: Absence of infection during hospitalization  8/24/2024 1509 by Jenn Nicholson RN  Outcome: Adequate for Discharge  8/24/2024 1508 by Jenn Nicholson RN  Outcome: Adequate for Discharge  Flowsheets (Taken

## 2024-08-24 NOTE — CARE COORDINATION
Social wok: working  on transfer to [facility] today. Will need elizabeth and Rx at discharge. Will complete hens.   Report:[phone] Phone  Rep [phone] in admissions  Fax [phone]  Polly bee    Social work; spoke with Martha reed to set up for today. Discharge order is in. Will fax updated elizabeth to snf. Will advise RN/Pt/snf of time once [facility] provides it. Polly bee    Social work pt in restroom, spoke to family and advise of time for snf transport..completed hens and faxed. Included a copy in paperwork to snf. Polly bee

## 2024-08-24 NOTE — PROGRESS NOTES
Daughter in law Ford at bedside and provided with pts home meds that were in the room locked cabinet.  PICC line removed and drsg applied without difficulty and pt tolerated well.

## 2024-08-24 NOTE — PROGRESS NOTES
Pt discharged to [facility]. PICC line and monitor removed. Pt and daughter in law Liliana do not have any questions or concerns at this time and Liliana to take home meds, and belongings with her. Pt wearing her own glasses in ambulance.

## 2024-08-24 NOTE — PROGRESS NOTES
Pt and daughter in law Liliana informed of transfer to [facility] in 30 minutes or so per [facility] stretcher.

## 2024-08-24 NOTE — PLAN OF CARE
Problem: Discharge Planning  Goal: Discharge to home or other facility with appropriate resources  Outcome: Adequate for Discharge  Flowsheets (Taken 8/24/2024 0900)  Discharge to home or other facility with appropriate resources:   Identify barriers to discharge with patient and caregiver   Arrange for needed discharge resources and transportation as appropriate     Problem: Skin/Tissue Integrity  Goal: Absence of new skin breakdown  Description: 1.  Monitor for areas of redness and/or skin breakdown  2.  Assess vascular access sites hourly  3.  Every 4-6 hours minimum:  Change oxygen saturation probe site  4.  Every 4-6 hours:  If on nasal continuous positive airway pressure, respiratory therapy assess nares and determine need for appliance change or resting period.  Outcome: Adequate for Discharge     Problem: Safety - Adult  Goal: Free from fall injury  Outcome: Adequate for Discharge     Problem: Pain  Goal: Verbalizes/displays adequate comfort level or baseline comfort level  Outcome: Adequate for Discharge  Flowsheets (Taken 8/24/2024 0900)  Verbalizes/displays adequate comfort level or baseline comfort level:   Encourage patient to monitor pain and request assistance   Assess pain using appropriate pain scale   Administer analgesics based on type and severity of pain and evaluate response   Implement non-pharmacological measures as appropriate and evaluate response     Problem: Neurosensory - Adult  Goal: Achieves stable or improved neurological status  Outcome: Adequate for Discharge  Flowsheets (Taken 8/24/2024 0900)  Achieves stable or improved neurological status:   Assess for and report changes in neurological status   Initiate measures to prevent increased intracranial pressure  Goal: Achieves maximal functionality and self care  Outcome: Adequate for Discharge  Flowsheets (Taken 8/24/2024 0900)  Achieves maximal functionality and self care: Monitor swallowing and airway patency with patient fatigue  Discharge  Flowsheets (Taken 8/24/2024 0900)  Absence of urinary retention:   Assess patient’s ability to void and empty bladder   Monitor intake/output and perform bladder scan as needed     Problem: Infection - Adult  Goal: Absence of infection during hospitalization  Outcome: Adequate for Discharge  Flowsheets (Taken 8/24/2024 0900)  Absence of infection during hospitalization:   Assess and monitor for signs and symptoms of infection   Monitor lab/diagnostic results     Problem: Metabolic/Fluid and Electrolytes - Adult  Goal: Electrolytes maintained within normal limits  Outcome: Adequate for Discharge  Flowsheets (Taken 8/24/2024 0900)  Electrolytes maintained within normal limits:   Monitor labs and assess patient for signs and symptoms of electrolyte imbalances   Administer electrolyte replacement as ordered     Problem: Hematologic - Adult  Goal: Maintains hematologic stability  Outcome: Adequate for Discharge  Flowsheets (Taken 8/24/2024 0900)  Maintains hematologic stability:   Assess for signs and symptoms of bleeding or hemorrhage   Monitor labs for bleeding or clotting disorders     Problem: Chronic Conditions and Co-morbidities  Goal: Patient's chronic conditions and co-morbidity symptoms are monitored and maintained or improved  Outcome: Adequate for Discharge  Flowsheets (Taken 8/24/2024 0900)  Care Plan - Patient's Chronic Conditions and Co-Morbidity Symptoms are Monitored and Maintained or Improved: Monitor and assess patient's chronic conditions and comorbid symptoms for stability, deterioration, or improvement     Problem: Nutrition Deficit:  Goal: Optimize nutritional status  Outcome: Adequate for Discharge

## 2024-08-24 NOTE — PLAN OF CARE
Problem: Discharge Planning  Goal: Discharge to home or other facility with appropriate resources  Outcome: Progressing  Flowsheets (Taken 8/23/2024 2000)  Discharge to home or other facility with appropriate resources:   Identify barriers to discharge with patient and caregiver   Arrange for needed discharge resources and transportation as appropriate   Refer to discharge planning if patient needs post-hospital services based on physician order or complex needs related to functional status, cognitive ability or social support system     Problem: Skin/Tissue Integrity  Goal: Absence of new skin breakdown  Description: 1.  Monitor for areas of redness and/or skin breakdown  2.  Assess vascular access sites hourly  3.  Every 4-6 hours minimum:  Change oxygen saturation probe site  4.  Every 4-6 hours:  If on nasal continuous positive airway pressure, respiratory therapy assess nares and determine need for appliance change or resting period.  Outcome: Progressing     Problem: Safety - Adult  Goal: Free from fall injury  Outcome: Progressing     Problem: Pain  Goal: Verbalizes/displays adequate comfort level or baseline comfort level  Outcome: Progressing     Problem: Neurosensory - Adult  Goal: Achieves stable or improved neurological status  Outcome: Progressing  Flowsheets (Taken 8/23/2024 2000)  Achieves stable or improved neurological status:   Assess for and report changes in neurological status   Initiate measures to prevent increased intracranial pressure   Maintain blood pressure and fluid volume within ordered parameters to optimize cerebral perfusion and minimize risk of hemorrhage   Monitor temperature, glucose, and sodium. Initiate appropriate interventions as ordered  Goal: Achieves maximal functionality and self care  Outcome: Progressing  Flowsheets (Taken 8/23/2024 2000)  Achieves maximal functionality and self care:   Monitor swallowing and airway patency with patient fatigue and changes in  neurological status   Encourage and assist patient to increase activity and self care with guidance from physical therapy/occupational therapy   Encourage visually impaired, hearing impaired and aphasic patients to use assistive/communication devices     Problem: Cardiovascular - Adult  Goal: Maintains optimal cardiac output and hemodynamic stability  Outcome: Progressing  Flowsheets (Taken 8/23/2024 2000)  Maintains optimal cardiac output and hemodynamic stability:   Monitor blood pressure and heart rate   Monitor urine output and notify Licensed Independent Practitioner for values outside of normal range   Assess for signs of decreased cardiac output   Administer fluid and/or volume expanders as ordered   Administer vasoactive medications as ordered     Problem: Skin/Tissue Integrity - Adult  Goal: Skin integrity remains intact  Outcome: Progressing  Flowsheets (Taken 8/23/2024 2000)  Skin Integrity Remains Intact:   Monitor for areas of redness and/or skin breakdown   Assess vascular access sites hourly     Problem: Musculoskeletal - Adult  Goal: Return mobility to safest level of function  Outcome: Progressing  Flowsheets (Taken 8/23/2024 2000)  Return Mobility to Safest Level of Function:   Assess patient stability and activity tolerance for standing, transferring and ambulating with or without assistive devices   Assist with transfers and ambulation using safe patient handling equipment as needed   Obtain physical therapy/occupational therapy consults as needed   Ensure adequate protection for wounds/incisions during mobilization   Apply continuous passive motion per provider or physical therapy orders to increase flexion toward goal   Instruct patient/family in ordered activity level  Goal: Return ADL status to a safe level of function  Outcome: Progressing  Flowsheets (Taken 8/23/2024 2000)  Return ADL Status to a Safe Level of Function:   Administer medication as ordered   Assess activities of daily living

## 2024-08-24 NOTE — PROGRESS NOTES
Attempted to call report to [phone] no answer and then phone rang long enough it hung up on me. Called [phone] and spoke with Martha who states she asked if writer could call a couple more times to give report. Writer called again to give report and no answer.

## 2024-08-24 NOTE — PROGRESS NOTES
[facility]  Office: [phone]  Randall Lundberg DO, Delon Robbins DO, Donte Joseph DO, Lexa Gamez DO, Shelli Gant MD, Shruti Soliz MD, Brandon Peter MD, Mery Hutchinson MD,  Rene Alas MD, Malick Chris MD, Mahsa Barrett MD,  Salome Rodriguez DO, Felicity Rangel MD, Mynor Mcmillan MD, Rohan Lundberg DO, Nati Arnold MD,  Antwan Rowan DO, Catrina Terry MD, Reny Olmstead MD, Rupali Trent MD, Raissa Cunningham MD,  Odilon Yee MD, Nick Wade MD, Surekha Ibrahim MD, Vickey Quintero MD, oJseph Pugh MD, Randy Regalado MD, Rene Black DO, Ramiro Munson DO, Quan Ravi MD,  Esteban Aranda MD, Shirley Waterhouse, CNP,  Sallie Romero CNP, Tadeo Sherman, CNP,  Polly Ramsey, POLINA, Loli Beltran, CNP, Natividad Gill, CNP, Yessica Juárez CNP, Marguerite Morelos, CNP, Jackeline Levin, PA-C, Sheila Neff PA-C, Jazmine Ashford, CNP, Nathalie Meredith, CNP, Norma Gauthier, CNP, Kristin Lawrence, CNP, Tala Hoover, CNS, Bhavani Rodriguez, TORITO, Gretta Palacios CNP, Tracy Schwab, CNP         [facility]   IN-PATIENT SERVICE   [facility]    Progress Note    8/24/2024    8:31 AM    Name:   Rosa Maria Reynolds  MRN:     [id number]     Acct:      [id number]   Room:   1109/1109-01   Day:  9  Admit Date:  8/15/2024  6:31 PM    PCP:   No primary care provider on file.  Code Status:  DNR-CCA    Subjective:     C/C: gi bleed  Interval History Status: not changed.     Denies cp/sob/n/v  No further bleeding  Does have some left lower rib pain  Abd pain present still; has had buq pain intermittently    Feels so-so    Brief History:     Per my DAVION:  \"Rosa Maria Joya is a 78 y.o. Unavailable / unknown female who presents with No chief complaint on file.   and is admitted to the hospital for the management of Upper GI bleed.     Patient presents as a transfer from [facility] where she initially presented with hematemesis. She was recently discharged home with coming hospital

## (undated) DEVICE — BITEBLOCK ENDOSCP 60FR MAXI WHT POLYETH STURDY W/ VELC WVN

## (undated) DEVICE — CATHETER SCLERO L240CM NDL 25GA L4MM SHTH DIA2.3MM CNTRST

## (undated) DEVICE — BIPOLAR ELECTROHEMOSTASIS CATHETER: Brand: GOLD PROBE

## (undated) DEVICE — STAZ ENDO KIT: Brand: MEDLINE INDUSTRIES, INC.